# Patient Record
Sex: MALE | Race: WHITE | Employment: OTHER | URBAN - METROPOLITAN AREA
[De-identification: names, ages, dates, MRNs, and addresses within clinical notes are randomized per-mention and may not be internally consistent; named-entity substitution may affect disease eponyms.]

---

## 2018-03-29 DIAGNOSIS — D51.0 PERNICIOUS ANEMIA: Primary | ICD-10-CM

## 2018-03-29 DIAGNOSIS — E55.9 VITAMIN D DEFICIENCY: ICD-10-CM

## 2018-03-29 DIAGNOSIS — E03.9 ACQUIRED HYPOTHYROIDISM: ICD-10-CM

## 2018-03-29 DIAGNOSIS — M15.0 PRIMARY GENERALIZED (OSTEO)ARTHRITIS: ICD-10-CM

## 2018-03-29 DIAGNOSIS — Z79.899 HIGH RISK MEDICATION USE: ICD-10-CM

## 2018-03-29 DIAGNOSIS — Z12.5 SCREENING PSA (PROSTATE SPECIFIC ANTIGEN): ICD-10-CM

## 2018-03-29 DIAGNOSIS — R53.83 FATIGUE, UNSPECIFIED TYPE: ICD-10-CM

## 2018-03-29 DIAGNOSIS — R13.10 PROBLEMS WITH SWALLOWING AND MASTICATION: ICD-10-CM

## 2018-03-29 DIAGNOSIS — E78.49 OTHER HYPERLIPIDEMIA: ICD-10-CM

## 2018-04-11 ENCOUNTER — HOSPITAL ENCOUNTER (OUTPATIENT)
Age: 83
Discharge: HOME | End: 2018-04-13
Payer: MEDICARE

## 2018-04-11 DIAGNOSIS — M15.0 PRIMARY GENERALIZED (OSTEO)ARTHRITIS: ICD-10-CM

## 2018-04-11 DIAGNOSIS — E55.9 VITAMIN D DEFICIENCY: ICD-10-CM

## 2018-04-11 DIAGNOSIS — R13.10 PROBLEMS WITH SWALLOWING AND MASTICATION: ICD-10-CM

## 2018-04-11 DIAGNOSIS — Z12.5 SCREENING PSA (PROSTATE SPECIFIC ANTIGEN): ICD-10-CM

## 2018-04-11 DIAGNOSIS — E03.9 ACQUIRED HYPOTHYROIDISM: ICD-10-CM

## 2018-04-11 DIAGNOSIS — R53.83 FATIGUE, UNSPECIFIED TYPE: ICD-10-CM

## 2018-04-11 DIAGNOSIS — D51.0 PERNICIOUS ANEMIA: ICD-10-CM

## 2018-04-11 DIAGNOSIS — Z79.899 HIGH RISK MEDICATION USE: ICD-10-CM

## 2018-04-11 DIAGNOSIS — E78.49 OTHER HYPERLIPIDEMIA: ICD-10-CM

## 2018-04-11 LAB
ALBUMIN SERPL-MCNC: 3.9 G/DL (ref 3.5–5.2)
ALP BLD-CCNC: 52 U/L (ref 40–129)
ALT SERPL-CCNC: 7 U/L (ref 0–40)
ANION GAP SERPL CALCULATED.3IONS-SCNC: 13 MMOL/L (ref 7–16)
AST SERPL-CCNC: 20 U/L (ref 0–39)
BILIRUB SERPL-MCNC: 0.3 MG/DL (ref 0–1.2)
BILIRUBIN URINE: NEGATIVE
BLOOD, URINE: NEGATIVE
BUN BLDV-MCNC: 18 MG/DL (ref 8–23)
CALCIUM SERPL-MCNC: 9.4 MG/DL (ref 8.6–10.2)
CHLORIDE BLD-SCNC: 107 MMOL/L (ref 98–107)
CHOLESTEROL, TOTAL: 139 MG/DL (ref 0–199)
CLARITY: CLEAR
CO2: 26 MMOL/L (ref 22–29)
COLOR: NORMAL
CREAT SERPL-MCNC: 1.4 MG/DL (ref 0.7–1.2)
GFR AFRICAN AMERICAN: 58
GFR NON-AFRICAN AMERICAN: 48 ML/MIN/1.73
GLUCOSE BLD-MCNC: 86 MG/DL (ref 74–109)
GLUCOSE URINE: NEGATIVE MG/DL
HBA1C MFR BLD: 5.2 % (ref 4.8–5.9)
HCT VFR BLD CALC: 38.2 % (ref 37–54)
HDLC SERPL-MCNC: 43 MG/DL
HEMOGLOBIN: 11.2 G/DL (ref 12.5–16.5)
KETONES, URINE: NEGATIVE MG/DL
LDL CHOLESTEROL CALCULATED: 76 MG/DL (ref 0–99)
LEUKOCYTE ESTERASE, URINE: NEGATIVE
MCH RBC QN AUTO: 26 PG (ref 26–35)
MCHC RBC AUTO-ENTMCNC: 29.3 % (ref 32–34.5)
MCV RBC AUTO: 88.6 FL (ref 80–99.9)
NITRITE, URINE: NEGATIVE
PDW BLD-RTO: 16.2 FL (ref 11.5–15)
PH UA: 6 (ref 5–9)
PLATELET # BLD: 223 E9/L (ref 130–450)
PMV BLD AUTO: 10.5 FL (ref 7–12)
POTASSIUM SERPL-SCNC: 4.5 MMOL/L (ref 3.5–5)
PROSTATE SPECIFIC ANTIGEN: 1.57 NG/ML (ref 0–4)
PROTEIN UA: NEGATIVE MG/DL
RBC # BLD: 4.31 E12/L (ref 3.8–5.8)
SODIUM BLD-SCNC: 146 MMOL/L (ref 132–146)
SPECIFIC GRAVITY UA: 1.01 (ref 1–1.03)
TOTAL PROTEIN: 7.2 G/DL (ref 6.4–8.3)
TRIGL SERPL-MCNC: 99 MG/DL (ref 0–149)
TSH SERPL DL<=0.05 MIU/L-ACNC: 0.84 UIU/ML (ref 0.27–4.2)
URIC ACID, SERUM: 6.3 MG/DL (ref 3.4–7)
UROBILINOGEN, URINE: 0.2 E.U./DL
VITAMIN D 25-HYDROXY: 21 NG/ML (ref 30–100)
VLDLC SERPL CALC-MCNC: 20 MG/DL
WBC # BLD: 6.1 E9/L (ref 4.5–11.5)

## 2018-04-11 PROCEDURE — 81003 URINALYSIS AUTO W/O SCOPE: CPT

## 2018-04-11 PROCEDURE — 85027 COMPLETE CBC AUTOMATED: CPT

## 2018-04-11 PROCEDURE — 84443 ASSAY THYROID STIM HORMONE: CPT

## 2018-04-11 PROCEDURE — 84550 ASSAY OF BLOOD/URIC ACID: CPT

## 2018-04-11 PROCEDURE — G0103 PSA SCREENING: HCPCS

## 2018-04-11 PROCEDURE — 82306 VITAMIN D 25 HYDROXY: CPT

## 2018-04-11 PROCEDURE — 83036 HEMOGLOBIN GLYCOSYLATED A1C: CPT

## 2018-04-11 PROCEDURE — 80053 COMPREHEN METABOLIC PANEL: CPT

## 2018-04-11 PROCEDURE — 80061 LIPID PANEL: CPT

## 2018-04-18 ENCOUNTER — OFFICE VISIT (OUTPATIENT)
Dept: PRIMARY CARE CLINIC | Age: 83
End: 2018-04-18
Payer: MEDICARE

## 2018-04-18 VITALS
SYSTOLIC BLOOD PRESSURE: 128 MMHG | RESPIRATION RATE: 16 BRPM | DIASTOLIC BLOOD PRESSURE: 72 MMHG | BODY MASS INDEX: 20.76 KG/M2 | TEMPERATURE: 97 F | HEART RATE: 80 BPM | HEIGHT: 70 IN | OXYGEN SATURATION: 96 % | WEIGHT: 145 LBS

## 2018-04-18 DIAGNOSIS — E03.9 ACQUIRED HYPOTHYROIDISM: ICD-10-CM

## 2018-04-18 DIAGNOSIS — R13.19 ESOPHAGEAL DYSPHAGIA: ICD-10-CM

## 2018-04-18 DIAGNOSIS — F41.9 ANXIETY: ICD-10-CM

## 2018-04-18 DIAGNOSIS — R97.20 PSA ELEVATION: ICD-10-CM

## 2018-04-18 DIAGNOSIS — E78.49 OTHER HYPERLIPIDEMIA: ICD-10-CM

## 2018-04-18 DIAGNOSIS — N18.30 STAGE 3 CHRONIC KIDNEY DISEASE (HCC): ICD-10-CM

## 2018-04-18 DIAGNOSIS — E55.9 VITAMIN D DEFICIENCY: ICD-10-CM

## 2018-04-18 DIAGNOSIS — R00.2 PALPITATION: ICD-10-CM

## 2018-04-18 DIAGNOSIS — E78.2 MIXED HYPERLIPIDEMIA: ICD-10-CM

## 2018-04-18 DIAGNOSIS — D63.8 ANEMIA, CHRONIC DISEASE: ICD-10-CM

## 2018-04-18 DIAGNOSIS — R73.09 ELEVATED HEMOGLOBIN A1C: ICD-10-CM

## 2018-04-18 DIAGNOSIS — Z00.00 ROUTINE GENERAL MEDICAL EXAMINATION AT A HEALTH CARE FACILITY: Primary | ICD-10-CM

## 2018-04-18 DIAGNOSIS — M15.0 PRIMARY GENERALIZED (OSTEO)ARTHRITIS: ICD-10-CM

## 2018-04-18 PROCEDURE — G0439 PPPS, SUBSEQ VISIT: HCPCS | Performed by: INTERNAL MEDICINE

## 2018-04-18 RX ORDER — SIMVASTATIN 40 MG
40 TABLET ORAL NIGHTLY
Qty: 90 TABLET | Refills: 3 | Status: SHIPPED | OUTPATIENT
Start: 2018-04-18 | End: 2018-10-24 | Stop reason: SDUPTHER

## 2018-04-18 RX ORDER — DUTASTERIDE 0.5 MG/1
0.5 CAPSULE, LIQUID FILLED ORAL DAILY
COMMUNITY
End: 2018-10-24

## 2018-04-18 RX ORDER — LEVOTHYROXINE SODIUM 88 UG/1
88 TABLET ORAL DAILY
Qty: 90 TABLET | Refills: 3 | Status: SHIPPED | OUTPATIENT
Start: 2018-04-18 | End: 2018-10-24 | Stop reason: SDUPTHER

## 2018-04-18 ASSESSMENT — LIFESTYLE VARIABLES: HOW OFTEN DO YOU HAVE A DRINK CONTAINING ALCOHOL: 0

## 2018-04-18 ASSESSMENT — PATIENT HEALTH QUESTIONNAIRE - PHQ9: SUM OF ALL RESPONSES TO PHQ QUESTIONS 1-9: 2

## 2018-04-18 ASSESSMENT — ANXIETY QUESTIONNAIRES: GAD7 TOTAL SCORE: 4

## 2018-10-16 ENCOUNTER — TELEPHONE (OUTPATIENT)
Dept: PRIMARY CARE CLINIC | Age: 83
End: 2018-10-16

## 2018-10-16 DIAGNOSIS — E03.9 ACQUIRED HYPOTHYROIDISM: ICD-10-CM

## 2018-10-16 DIAGNOSIS — Z79.899 ENCOUNTER FOR LONG-TERM (CURRENT) USE OF MEDICATIONS: ICD-10-CM

## 2018-10-16 DIAGNOSIS — N18.30 CHRONIC KIDNEY DISEASE, STAGE III (MODERATE) (HCC): ICD-10-CM

## 2018-10-16 DIAGNOSIS — E55.9 VITAMIN D DEFICIENCY: ICD-10-CM

## 2018-10-16 DIAGNOSIS — D63.8 CHRONIC DISEASE ANEMIA: ICD-10-CM

## 2018-10-16 DIAGNOSIS — R97.20 ELEVATED PSA: ICD-10-CM

## 2018-10-16 DIAGNOSIS — M15.0 PRIMARY GENERALIZED (OSTEO)ARTHRITIS: Primary | ICD-10-CM

## 2018-10-16 DIAGNOSIS — Z12.5 PROSTATE CANCER SCREENING: ICD-10-CM

## 2018-10-17 ENCOUNTER — HOSPITAL ENCOUNTER (OUTPATIENT)
Age: 83
Discharge: HOME | End: 2018-10-19
Payer: MEDICARE

## 2018-10-17 DIAGNOSIS — E78.2 MIXED HYPERLIPIDEMIA: ICD-10-CM

## 2018-10-17 DIAGNOSIS — Z12.5 PROSTATE CANCER SCREENING: ICD-10-CM

## 2018-10-17 DIAGNOSIS — D63.8 CHRONIC DISEASE ANEMIA: ICD-10-CM

## 2018-10-17 DIAGNOSIS — E03.9 ACQUIRED HYPOTHYROIDISM: ICD-10-CM

## 2018-10-17 DIAGNOSIS — R97.20 ELEVATED PSA: ICD-10-CM

## 2018-10-17 DIAGNOSIS — Z79.899 ENCOUNTER FOR LONG-TERM (CURRENT) USE OF MEDICATIONS: ICD-10-CM

## 2018-10-17 DIAGNOSIS — E78.49 OTHER HYPERLIPIDEMIA: ICD-10-CM

## 2018-10-17 DIAGNOSIS — R73.09 ELEVATED HEMOGLOBIN A1C: ICD-10-CM

## 2018-10-17 DIAGNOSIS — N18.30 CHRONIC KIDNEY DISEASE, STAGE III (MODERATE) (HCC): ICD-10-CM

## 2018-10-17 DIAGNOSIS — M15.0 PRIMARY GENERALIZED (OSTEO)ARTHRITIS: ICD-10-CM

## 2018-10-17 DIAGNOSIS — E55.9 VITAMIN D DEFICIENCY: ICD-10-CM

## 2018-10-17 LAB
ALBUMIN SERPL-MCNC: 4.2 G/DL (ref 3.5–5.2)
ALP BLD-CCNC: 56 U/L (ref 40–129)
ALT SERPL-CCNC: 11 U/L (ref 0–40)
ANION GAP SERPL CALCULATED.3IONS-SCNC: 12 MMOL/L (ref 7–16)
AST SERPL-CCNC: 31 U/L (ref 0–39)
BASOPHILS ABSOLUTE: 0.03 E9/L (ref 0–0.2)
BASOPHILS RELATIVE PERCENT: 0.5 % (ref 0–2)
BILIRUB SERPL-MCNC: 0.4 MG/DL (ref 0–1.2)
BILIRUBIN URINE: NEGATIVE
BLOOD, URINE: NEGATIVE
BUN BLDV-MCNC: 22 MG/DL (ref 8–23)
CALCIUM SERPL-MCNC: 9.3 MG/DL (ref 8.6–10.2)
CHLORIDE BLD-SCNC: 108 MMOL/L (ref 98–107)
CHOLESTEROL, TOTAL: 139 MG/DL (ref 0–199)
CLARITY: CLEAR
CO2: 25 MMOL/L (ref 22–29)
COLOR: YELLOW
CREAT SERPL-MCNC: 1.4 MG/DL (ref 0.7–1.2)
EOSINOPHILS ABSOLUTE: 0.01 E9/L (ref 0.05–0.5)
EOSINOPHILS RELATIVE PERCENT: 0.2 % (ref 0–6)
GFR AFRICAN AMERICAN: 58
GFR NON-AFRICAN AMERICAN: 48 ML/MIN/1.73
GLUCOSE BLD-MCNC: 81 MG/DL (ref 74–109)
GLUCOSE URINE: NEGATIVE MG/DL
HBA1C MFR BLD: 5.2 % (ref 4–5.6)
HCT VFR BLD CALC: 39.7 % (ref 37–54)
HDLC SERPL-MCNC: 52 MG/DL
HEMOGLOBIN: 11.7 G/DL (ref 12.5–16.5)
IMMATURE GRANULOCYTES #: 0.02 E9/L
IMMATURE GRANULOCYTES %: 0.3 % (ref 0–5)
KETONES, URINE: NEGATIVE MG/DL
LDL CHOLESTEROL CALCULATED: 74 MG/DL (ref 0–99)
LEUKOCYTE ESTERASE, URINE: NEGATIVE
LYMPHOCYTES ABSOLUTE: 2.37 E9/L (ref 1.5–4)
LYMPHOCYTES RELATIVE PERCENT: 40.8 % (ref 20–42)
MCH RBC QN AUTO: 26.1 PG (ref 26–35)
MCHC RBC AUTO-ENTMCNC: 29.5 % (ref 32–34.5)
MCV RBC AUTO: 88.6 FL (ref 80–99.9)
MONOCYTES ABSOLUTE: 1.47 E9/L (ref 0.1–0.95)
MONOCYTES RELATIVE PERCENT: 25.3 % (ref 2–12)
NEUTROPHILS ABSOLUTE: 1.91 E9/L (ref 1.8–7.3)
NEUTROPHILS RELATIVE PERCENT: 32.9 % (ref 43–80)
NITRITE, URINE: NEGATIVE
PDW BLD-RTO: 16.5 FL (ref 11.5–15)
PH UA: 6 (ref 5–9)
PLATELET # BLD: 247 E9/L (ref 130–450)
PMV BLD AUTO: 11.3 FL (ref 7–12)
POTASSIUM SERPL-SCNC: 4.6 MMOL/L (ref 3.5–5)
PROSTATE SPECIFIC ANTIGEN: 1.81 NG/ML (ref 0–4)
PROTEIN UA: NEGATIVE MG/DL
RBC # BLD: 4.48 E12/L (ref 3.8–5.8)
SODIUM BLD-SCNC: 145 MMOL/L (ref 132–146)
SPECIFIC GRAVITY UA: 1.01 (ref 1–1.03)
TOTAL PROTEIN: 7.4 G/DL (ref 6.4–8.3)
TRIGL SERPL-MCNC: 66 MG/DL (ref 0–149)
TSH SERPL DL<=0.05 MIU/L-ACNC: 4.3 UIU/ML (ref 0.27–4.2)
UROBILINOGEN, URINE: 0.2 E.U./DL
VLDLC SERPL CALC-MCNC: 13 MG/DL
WBC # BLD: 5.8 E9/L (ref 4.5–11.5)

## 2018-10-17 PROCEDURE — 80061 LIPID PANEL: CPT

## 2018-10-17 PROCEDURE — G0103 PSA SCREENING: HCPCS

## 2018-10-17 PROCEDURE — 81003 URINALYSIS AUTO W/O SCOPE: CPT

## 2018-10-17 PROCEDURE — 80053 COMPREHEN METABOLIC PANEL: CPT

## 2018-10-17 PROCEDURE — 82652 VIT D 1 25-DIHYDROXY: CPT

## 2018-10-17 PROCEDURE — 85025 COMPLETE CBC W/AUTO DIFF WBC: CPT

## 2018-10-17 PROCEDURE — 83036 HEMOGLOBIN GLYCOSYLATED A1C: CPT

## 2018-10-17 PROCEDURE — 84443 ASSAY THYROID STIM HORMONE: CPT

## 2018-10-19 LAB — VITAMIN D 1,25-DIHYDROXY: 68.4 PG/ML (ref 19.9–79.3)

## 2018-10-24 ENCOUNTER — OFFICE VISIT (OUTPATIENT)
Dept: PRIMARY CARE CLINIC | Age: 83
End: 2018-10-24
Payer: MEDICARE

## 2018-10-24 ENCOUNTER — TELEPHONE (OUTPATIENT)
Dept: PRIMARY CARE CLINIC | Age: 83
End: 2018-10-24

## 2018-10-24 VITALS
TEMPERATURE: 98.1 F | OXYGEN SATURATION: 98 % | SYSTOLIC BLOOD PRESSURE: 128 MMHG | BODY MASS INDEX: 21.19 KG/M2 | RESPIRATION RATE: 16 BRPM | HEART RATE: 68 BPM | HEIGHT: 70 IN | WEIGHT: 148 LBS | DIASTOLIC BLOOD PRESSURE: 72 MMHG

## 2018-10-24 DIAGNOSIS — G89.29 CHRONIC RIGHT SHOULDER PAIN: ICD-10-CM

## 2018-10-24 DIAGNOSIS — M15.0 PRIMARY GENERALIZED (OSTEO)ARTHRITIS: ICD-10-CM

## 2018-10-24 DIAGNOSIS — E78.2 MIXED HYPERLIPIDEMIA: ICD-10-CM

## 2018-10-24 DIAGNOSIS — Z23 ENCOUNTER FOR IMMUNIZATION: Primary | ICD-10-CM

## 2018-10-24 DIAGNOSIS — R53.83 FATIGUE, UNSPECIFIED TYPE: ICD-10-CM

## 2018-10-24 DIAGNOSIS — G89.29 CHRONIC RIGHT SHOULDER PAIN: Primary | ICD-10-CM

## 2018-10-24 DIAGNOSIS — Z71.85 IMMUNIZATION COUNSELING: ICD-10-CM

## 2018-10-24 DIAGNOSIS — M25.511 CHRONIC RIGHT SHOULDER PAIN: ICD-10-CM

## 2018-10-24 DIAGNOSIS — R13.19 ESOPHAGEAL DYSPHAGIA: ICD-10-CM

## 2018-10-24 DIAGNOSIS — N40.0 PROSTATE ENLARGEMENT: ICD-10-CM

## 2018-10-24 DIAGNOSIS — E03.9 ACQUIRED HYPOTHYROIDISM: ICD-10-CM

## 2018-10-24 DIAGNOSIS — M25.511 CHRONIC RIGHT SHOULDER PAIN: Primary | ICD-10-CM

## 2018-10-24 DIAGNOSIS — N18.30 CHRONIC KIDNEY DISEASE, STAGE 3 (HCC): ICD-10-CM

## 2018-10-24 DIAGNOSIS — D63.8 ANEMIA, CHRONIC DISEASE: ICD-10-CM

## 2018-10-24 PROCEDURE — G0008 ADMIN INFLUENZA VIRUS VAC: HCPCS | Performed by: INTERNAL MEDICINE

## 2018-10-24 PROCEDURE — 99214 OFFICE O/P EST MOD 30 MIN: CPT | Performed by: INTERNAL MEDICINE

## 2018-10-24 PROCEDURE — 90688 IIV4 VACCINE SPLT 0.5 ML IM: CPT | Performed by: INTERNAL MEDICINE

## 2018-10-24 RX ORDER — SIMVASTATIN 40 MG
40 TABLET ORAL NIGHTLY
Qty: 90 TABLET | Refills: 3 | Status: SHIPPED | OUTPATIENT
Start: 2018-10-24 | End: 2019-05-02 | Stop reason: SDUPTHER

## 2018-10-24 RX ORDER — FINASTERIDE 5 MG/1
5 TABLET, FILM COATED ORAL DAILY
Refills: 0 | COMMUNITY
Start: 2018-07-18 | End: 2019-05-02

## 2018-10-24 RX ORDER — LEVOTHYROXINE SODIUM 88 UG/1
88 TABLET ORAL DAILY
Qty: 90 TABLET | Refills: 3 | Status: SHIPPED | OUTPATIENT
Start: 2018-10-24 | End: 2019-05-02 | Stop reason: SDUPTHER

## 2018-10-24 ASSESSMENT — ENCOUNTER SYMPTOMS
DIARRHEA: 0
BLOOD IN STOOL: 0
SHORTNESS OF BREATH: 0
EYE REDNESS: 0
EYE PAIN: 0
STRIDOR: 0
NAUSEA: 0

## 2018-10-24 NOTE — PROGRESS NOTES
Vaccine Information Sheet, \"Influenza - Inactivated\"  given to Saira Mireles, or parent/legal guardian of  Saira Mireles and verbalized understanding. Patient responses:    Have you ever had a reaction to a flu vaccine? No  Are you able to eat eggs without adverse effects? Yes  Do you have any current illness? No  Have you ever had Guillian [place] Syndrome? No    Flu vaccine given per order. Please see immunization tab.

## 2018-11-07 ENCOUNTER — OFFICE VISIT (OUTPATIENT)
Dept: ORTHOPEDIC SURGERY | Age: 83
End: 2018-11-07
Payer: MEDICARE

## 2018-11-07 VITALS
HEIGHT: 70 IN | TEMPERATURE: 98 F | WEIGHT: 145 LBS | HEART RATE: 70 BPM | DIASTOLIC BLOOD PRESSURE: 84 MMHG | SYSTOLIC BLOOD PRESSURE: 140 MMHG | BODY MASS INDEX: 20.76 KG/M2

## 2018-11-07 DIAGNOSIS — M25.511 ACUTE PAIN OF RIGHT SHOULDER: Primary | ICD-10-CM

## 2018-11-07 PROCEDURE — 99203 OFFICE O/P NEW LOW 30 MIN: CPT | Performed by: ORTHOPAEDIC SURGERY

## 2018-11-07 NOTE — PROGRESS NOTES
New Shoulder Patient Visit     Referring Provider:   Dr Tiffanie Peoples    Dear Dr. Tiffanie Peoples,    Thank you for your referral of Alexandria Mtz. He was seen by me on 11/7/18. Please refer to my office note below. If you have any questions or concerns, please do not hesitate to call. Akua Valle MD  Orthopaedic Surgery                     CHIEF COMPLAINT:   Chief Complaint   Patient presents with    Shoulder Pain     (R) shoulder x several months, denies fall or injury; pain in front lateral aspect as well as occassional posterior shoulder blade pain    Results     (R) shoulder XR today        HPI:      Alexandria Mtz is a 80y.o. year old male who is seen today  for evaluation of right shoulder pain. He reports the pain has been ongoing for the past Several months. It came on gradually without any injury. The pain is intermittent. He does report having good function of the shoulder as well as good range of motion. He has occasional aches and also occasional stabbing pain that is short lived. He has not had any recent treatment. The patient is right hand dominant. The patient is not working. He is retired    PAST MEDICAL HISTORY  Past Medical History:   Diagnosis Date    Enlarged prostate     Hyperlipidemia     Thyroid disease        PAST SURGICAL HISTORY  Past Surgical History:   Procedure Laterality Date    ESOPHAGUS SURGERY      x 3    TOOTH EXTRACTION         FAMILY HISTORY   No family history on file. SOCIAL HISTORY  Social History     Occupational History    Not on file.      Social History Main Topics    Smoking status: Never Smoker    Smokeless tobacco: Never Used    Alcohol use No    Drug use: No    Sexual activity: Not on file       CURRENT MEDICATIONS     Current Outpatient Prescriptions:     finasteride (PROSCAR) 5 MG tablet, Take 5 mg by mouth daily , Disp: , Rfl: 0    Cholecalciferol (VITAMIN D3) 1000 units CAPS, Take by mouth daily, Disp: , Rfl:     Naproxen Sodium (ALEVE degenerative changes. There is minimal evidence of superior humeral head migration to suggest rotator cuff pathology    Radiographic findings reviewed with patient    ASSESSMENT   Right shoulder pain      PLAN  We discussed his shoulder today. He is not having pain today. He has very good range of motion and strength. I recommended a home exercise program.  He is agreeable. We did discuss trying a steroid injection if he develops more persistent pain. He is in agreement. We will see him back in about 2 months.         Sunil Pedraza MD  Orthopaedic Surgery   11/7/18  10:40 AM

## 2019-01-07 ENCOUNTER — OFFICE VISIT (OUTPATIENT)
Dept: ORTHOPEDIC SURGERY | Age: 84
End: 2019-01-07
Payer: MEDICARE

## 2019-01-07 VITALS
HEART RATE: 79 BPM | DIASTOLIC BLOOD PRESSURE: 77 MMHG | BODY MASS INDEX: 21.47 KG/M2 | HEIGHT: 70 IN | WEIGHT: 150 LBS | SYSTOLIC BLOOD PRESSURE: 139 MMHG

## 2019-01-07 DIAGNOSIS — M25.511 ACUTE PAIN OF RIGHT SHOULDER: Primary | ICD-10-CM

## 2019-01-07 PROCEDURE — 99213 OFFICE O/P EST LOW 20 MIN: CPT | Performed by: ORTHOPAEDIC SURGERY

## 2019-03-28 ENCOUNTER — TELEPHONE (OUTPATIENT)
Dept: PRIMARY CARE CLINIC | Age: 84
End: 2019-03-28

## 2019-03-28 DIAGNOSIS — L98.9 SKIN LESION: Primary | ICD-10-CM

## 2019-04-22 ENCOUNTER — TELEPHONE (OUTPATIENT)
Dept: PRIMARY CARE CLINIC | Age: 84
End: 2019-04-22

## 2019-04-22 DIAGNOSIS — Z79.899 ENCOUNTER FOR LONG-TERM (CURRENT) USE OF MEDICATIONS: Primary | ICD-10-CM

## 2019-04-22 DIAGNOSIS — D63.8 CHRONIC DISEASE ANEMIA: ICD-10-CM

## 2019-04-22 DIAGNOSIS — N18.30 CHRONIC RENAL DISEASE, STAGE III (HCC): ICD-10-CM

## 2019-04-22 DIAGNOSIS — E03.9 ACQUIRED HYPOTHYROIDISM: ICD-10-CM

## 2019-04-22 DIAGNOSIS — E78.2 MIXED HYPERLIPIDEMIA: ICD-10-CM

## 2019-04-25 ENCOUNTER — HOSPITAL ENCOUNTER (OUTPATIENT)
Age: 84
Discharge: HOME | End: 2019-04-27
Payer: MEDICARE

## 2019-04-25 DIAGNOSIS — Z79.899 ENCOUNTER FOR LONG-TERM (CURRENT) USE OF MEDICATIONS: ICD-10-CM

## 2019-04-25 DIAGNOSIS — D63.8 CHRONIC DISEASE ANEMIA: ICD-10-CM

## 2019-04-25 DIAGNOSIS — N18.30 CHRONIC RENAL DISEASE, STAGE III (HCC): ICD-10-CM

## 2019-04-25 DIAGNOSIS — E03.9 ACQUIRED HYPOTHYROIDISM: ICD-10-CM

## 2019-04-25 DIAGNOSIS — E78.2 MIXED HYPERLIPIDEMIA: ICD-10-CM

## 2019-04-25 LAB
ALBUMIN SERPL-MCNC: 4.2 G/DL (ref 3.5–5.2)
ALP BLD-CCNC: 50 U/L (ref 40–129)
ALT SERPL-CCNC: 8 U/L (ref 0–40)
ANION GAP SERPL CALCULATED.3IONS-SCNC: 14 MMOL/L (ref 7–16)
AST SERPL-CCNC: 20 U/L (ref 0–39)
BASOPHILS ABSOLUTE: 0.03 E9/L (ref 0–0.2)
BASOPHILS RELATIVE PERCENT: 0.6 % (ref 0–2)
BILIRUB SERPL-MCNC: 0.3 MG/DL (ref 0–1.2)
BUN BLDV-MCNC: 23 MG/DL (ref 8–23)
CALCIUM SERPL-MCNC: 9.6 MG/DL (ref 8.6–10.2)
CHLORIDE BLD-SCNC: 109 MMOL/L (ref 98–107)
CHOLESTEROL, TOTAL: 139 MG/DL (ref 0–199)
CO2: 23 MMOL/L (ref 22–29)
CREAT SERPL-MCNC: 1.3 MG/DL (ref 0.7–1.2)
EOSINOPHILS ABSOLUTE: 0.01 E9/L (ref 0.05–0.5)
EOSINOPHILS RELATIVE PERCENT: 0.2 % (ref 0–6)
GFR AFRICAN AMERICAN: >60
GFR NON-AFRICAN AMERICAN: 52 ML/MIN/1.73
GLUCOSE BLD-MCNC: 86 MG/DL (ref 74–99)
HCT VFR BLD CALC: 39.5 % (ref 37–54)
HDLC SERPL-MCNC: 41 MG/DL
HEMOGLOBIN: 11.6 G/DL (ref 12.5–16.5)
IMMATURE GRANULOCYTES #: 0.02 E9/L
IMMATURE GRANULOCYTES %: 0.4 % (ref 0–5)
LDL CHOLESTEROL CALCULATED: 77 MG/DL (ref 0–99)
LYMPHOCYTES ABSOLUTE: 2.5 E9/L (ref 1.5–4)
LYMPHOCYTES RELATIVE PERCENT: 46 % (ref 20–42)
MCH RBC QN AUTO: 26.5 PG (ref 26–35)
MCHC RBC AUTO-ENTMCNC: 29.4 % (ref 32–34.5)
MCV RBC AUTO: 90.2 FL (ref 80–99.9)
MONOCYTES ABSOLUTE: 1.4 E9/L (ref 0.1–0.95)
MONOCYTES RELATIVE PERCENT: 25.7 % (ref 2–12)
NEUTROPHILS ABSOLUTE: 1.48 E9/L (ref 1.8–7.3)
NEUTROPHILS RELATIVE PERCENT: 27.1 % (ref 43–80)
PDW BLD-RTO: 16.5 FL (ref 11.5–15)
PLATELET # BLD: 219 E9/L (ref 130–450)
PMV BLD AUTO: 11.2 FL (ref 7–12)
POTASSIUM SERPL-SCNC: 4.3 MMOL/L (ref 3.5–5)
RBC # BLD: 4.38 E12/L (ref 3.8–5.8)
SODIUM BLD-SCNC: 146 MMOL/L (ref 132–146)
TOTAL PROTEIN: 7.6 G/DL (ref 6.4–8.3)
TRIGL SERPL-MCNC: 104 MG/DL (ref 0–149)
TSH SERPL DL<=0.05 MIU/L-ACNC: 2.27 UIU/ML (ref 0.27–4.2)
URIC ACID, SERUM: 6.4 MG/DL (ref 3.4–7)
VLDLC SERPL CALC-MCNC: 21 MG/DL
WBC # BLD: 5.4 E9/L (ref 4.5–11.5)

## 2019-04-25 PROCEDURE — 84443 ASSAY THYROID STIM HORMONE: CPT

## 2019-04-25 PROCEDURE — 80053 COMPREHEN METABOLIC PANEL: CPT

## 2019-04-25 PROCEDURE — 84550 ASSAY OF BLOOD/URIC ACID: CPT

## 2019-04-25 PROCEDURE — 85025 COMPLETE CBC W/AUTO DIFF WBC: CPT

## 2019-04-25 PROCEDURE — 80061 LIPID PANEL: CPT

## 2019-05-02 ENCOUNTER — OFFICE VISIT (OUTPATIENT)
Dept: PRIMARY CARE CLINIC | Age: 84
End: 2019-05-02
Payer: MEDICARE

## 2019-05-02 VITALS
WEIGHT: 153.12 LBS | SYSTOLIC BLOOD PRESSURE: 128 MMHG | DIASTOLIC BLOOD PRESSURE: 82 MMHG | TEMPERATURE: 98 F | BODY MASS INDEX: 21.92 KG/M2 | HEIGHT: 70 IN | OXYGEN SATURATION: 98 % | HEART RATE: 73 BPM | RESPIRATION RATE: 16 BRPM

## 2019-05-02 DIAGNOSIS — E55.9 VITAMIN D DEFICIENCY: ICD-10-CM

## 2019-05-02 DIAGNOSIS — E78.2 MIXED HYPERLIPIDEMIA: ICD-10-CM

## 2019-05-02 DIAGNOSIS — Z12.5 SCREENING PSA (PROSTATE SPECIFIC ANTIGEN): ICD-10-CM

## 2019-05-02 DIAGNOSIS — N18.30 CHRONIC KIDNEY DISEASE, STAGE 3 (HCC): Primary | ICD-10-CM

## 2019-05-02 DIAGNOSIS — M15.0 PRIMARY GENERALIZED (OSTEO)ARTHRITIS: ICD-10-CM

## 2019-05-02 DIAGNOSIS — E03.9 ACQUIRED HYPOTHYROIDISM: ICD-10-CM

## 2019-05-02 DIAGNOSIS — D63.8 ANEMIA, CHRONIC DISEASE: ICD-10-CM

## 2019-05-02 DIAGNOSIS — K22.2 LOWER ESOPHAGEAL RING (SCHATZKI): ICD-10-CM

## 2019-05-02 PROCEDURE — 99214 OFFICE O/P EST MOD 30 MIN: CPT | Performed by: INTERNAL MEDICINE

## 2019-05-02 RX ORDER — TRIAMCINOLONE ACETONIDE 1 MG/G
CREAM TOPICAL 2 TIMES DAILY PRN
Qty: 1 G | Refills: 1 | Status: SHIPPED | OUTPATIENT
Start: 2019-05-02 | End: 2019-08-21 | Stop reason: SDUPTHER

## 2019-05-02 RX ORDER — SIMVASTATIN 40 MG
40 TABLET ORAL NIGHTLY
Qty: 90 TABLET | Refills: 3 | Status: SHIPPED | OUTPATIENT
Start: 2019-05-02 | End: 2019-08-21 | Stop reason: SDUPTHER

## 2019-05-02 RX ORDER — TRIAMCINOLONE ACETONIDE 1 MG/G
CREAM TOPICAL
COMMUNITY
End: 2019-05-02 | Stop reason: SDUPTHER

## 2019-05-02 RX ORDER — LEVOTHYROXINE SODIUM 88 UG/1
88 TABLET ORAL DAILY
Qty: 90 TABLET | Refills: 3 | Status: SHIPPED | OUTPATIENT
Start: 2019-05-02 | End: 2019-08-21 | Stop reason: SDUPTHER

## 2019-05-02 ASSESSMENT — ENCOUNTER SYMPTOMS
RHINORRHEA: 1
DIARRHEA: 0
CHEST TIGHTNESS: 0
CONSTIPATION: 0
ABDOMINAL PAIN: 0
SORE THROAT: 0
COUGH: 0
NAUSEA: 0
SHORTNESS OF BREATH: 0
VOMITING: 0
EYE PAIN: 0
BLOOD IN STOOL: 0

## 2019-05-02 ASSESSMENT — PATIENT HEALTH QUESTIONNAIRE - PHQ9
SUM OF ALL RESPONSES TO PHQ QUESTIONS 1-9: 0
1. LITTLE INTEREST OR PLEASURE IN DOING THINGS: 0
2. FEELING DOWN, DEPRESSED OR HOPELESS: 0
SUM OF ALL RESPONSES TO PHQ9 QUESTIONS 1 & 2: 0
SUM OF ALL RESPONSES TO PHQ QUESTIONS 1-9: 0

## 2019-05-02 NOTE — PROGRESS NOTES
nervous/anxious. Current Outpatient Medications:     simvastatin (ZOCOR) 40 MG tablet, Take 1 tablet by mouth nightly, Disp: 90 tablet, Rfl: 3    levothyroxine (SYNTHROID) 88 MCG tablet, Take 1 tablet by mouth Daily, Disp: 90 tablet, Rfl: 3    triamcinolone (KENALOG) 0.1 % cream, Apply topically 2 times daily as needed (rash) Indications: PRN Apply topically 2 times daily. , Disp: 1 g, Rfl: 1    Cholecalciferol (VITAMIN D3) 1000 units CAPS, Take by mouth daily, Disp: , Rfl:     Naproxen Sodium (ALEVE PO), Take by mouth nightly as needed, Disp: , Rfl:     No Known Allergies    Past Medical History:   Diagnosis Date    Enlarged prostate     Hyperlipidemia     Lower esophageal ring (Schatzki) 5/2/2019    Mixed hyperlipidemia 5/2/2019    Thyroid disease        Past Surgical History:   Procedure Laterality Date    ESOPHAGUS SURGERY      x 3    TOOTH EXTRACTION         No family history on file. Social History     Socioeconomic History    Marital status:       Spouse name: Not on file    Number of children: Not on file    Years of education: Not on file    Highest education level: Not on file   Occupational History    Not on file   Social Needs    Financial resource strain: Not on file    Food insecurity:     Worry: Not on file     Inability: Not on file    Transportation needs:     Medical: Not on file     Non-medical: Not on file   Tobacco Use    Smoking status: Never Smoker    Smokeless tobacco: Never Used   Substance and Sexual Activity    Alcohol use: No    Drug use: No    Sexual activity: Not on file   Lifestyle    Physical activity:     Days per week: Not on file     Minutes per session: Not on file    Stress: Not on file   Relationships    Social connections:     Talks on phone: Not on file     Gets together: Not on file     Attends Buddhist service: Not on file     Active member of club or organization: Not on file     Attends meetings of clubs or organizations: Not on file     Relationship status: Not on file    Intimate partner violence:     Fear of current or ex partner: Not on file     Emotionally abused: Not on file     Physically abused: Not on file     Forced sexual activity: Not on file   Other Topics Concern    Not on file   Social History Narrative    Not on file       Vitals:    05/02/19 1257   BP: 128/82   Site: Left Upper Arm   Position: Sitting   Cuff Size: Small Adult   Pulse: 73   Resp: 16   Temp: 98 °F (36.7 °C)   TempSrc: Tympanic   SpO2: 98%   Weight: 153 lb 1.9 oz (69.5 kg)   Height: 5' 10\" (1.778 m)       Exam:  Physical Exam   Constitutional: He is oriented to person, place, and time. He appears well-developed and well-nourished. HENT:   Head: Normocephalic and atraumatic. Right Ear: External ear normal.   Left Ear: External ear normal.   Mouth/Throat: Oropharynx is clear and moist.   Eyes: Pupils are equal, round, and reactive to light. EOM are normal.   Neck: Normal range of motion. Neck supple. No thyromegaly present. Cardiovascular: Normal rate, regular rhythm and normal heart sounds. No murmur heard. Pulmonary/Chest: Effort normal and breath sounds normal. He has no wheezes. He has no rales. Abdominal: Soft. Bowel sounds are normal. There is no tenderness. There is no rebound and no guarding. Musculoskeletal: Normal range of motion. He exhibits no edema. Lymphadenopathy:     He has no cervical adenopathy. Neurological: He is alert and oriented to person, place, and time. No cranial nerve deficit. Skin: Skin is warm and dry. Psychiatric: He has a normal mood and affect. Judgment normal.       Assessment and Plan:    [facility] was seen today for hyperlipidemia, thyroid problem, discuss labs and health maintenance.     Diagnoses and all orders for this visit:    Mixed hyperlipidemia  - Continue present treatment   - watch diet   - on simvastatin   - follow labs     Acquired hypothyroidism  - Continue present treatment   - on levothyroxine   - follow labs (last 4/2019) - stable     Chronic kidney disease, stage 3 ([facility] [address]  - Continue present treatment   - follow labs   - avoid NSAIDs    Anemia, chronic disease  - Continue present treatment   - follow labs   - monitor for bleeding   - check Iron, b12   - follow labs     Lower esophageal ring (Schatzki)  - Continue present treatment   - last EGD (2019)   - declined PPI     Vitamin D deficiency  - continue present treatment   - on otc supplement   - follow labs     Primary generalized (osteo)arthritis  - Continue present treatment   - avoid NSAIDs   - tylenol as needed  - stable     Return in about 6 months (around 11/2/2019) for check up and review and labs. Orders Placed This Encounter   Procedures    CBC with Differential     Standing Status:   Future     Standing Expiration Date:   5/2/2020    Comprehensive Metabolic Panel     Standing Status:   Future     Standing Expiration Date:   5/2/2020    Lipid, Fasting     Standing Status:   Future     Standing Expiration Date:   5/2/2020    Magnesium     Standing Status:   Future     Standing Expiration Date:   5/2/2020    TSH without Reflex     Standing Status:   Future     Standing Expiration Date:   5/2/2020    Vitamin D 25 Hydroxy     Standing Status:   Future     Standing Expiration Date:   5/2/2020    Psa screening     Standing Status:   Future     Standing Expiration Date:   5/2/2020    VITAMIN B12 & FOLATE     Standing Status:   Future     Standing Expiration Date:   5/2/2020    IRON AND TIBC     Standing Status:   Future     Standing Expiration Date:   5/2/2020     Order Specific Question:   Is Patient Fasting? Answer:   yes     Order Specific Question:   No of Hours?      Answer:   8    FERRITIN     Standing Status:   Future     Standing Expiration Date:   5/2/2020       Robert Khan MD  5/2/2019  1:49 PM

## 2019-06-20 ENCOUNTER — TELEPHONE (OUTPATIENT)
Dept: PRIMARY CARE CLINIC | Age: 84
End: 2019-06-20

## 2019-08-21 DIAGNOSIS — E78.2 MIXED HYPERLIPIDEMIA: ICD-10-CM

## 2019-08-21 DIAGNOSIS — M15.0 PRIMARY GENERALIZED (OSTEO)ARTHRITIS: ICD-10-CM

## 2019-08-21 DIAGNOSIS — E03.9 ACQUIRED HYPOTHYROIDISM: ICD-10-CM

## 2019-08-21 RX ORDER — TRIAMCINOLONE ACETONIDE 1 MG/G
CREAM TOPICAL 2 TIMES DAILY PRN
Qty: 80 G | Refills: 1 | Status: SHIPPED | OUTPATIENT
Start: 2019-08-21 | End: 2019-11-21 | Stop reason: SDUPTHER

## 2019-08-21 RX ORDER — SIMVASTATIN 40 MG
40 TABLET ORAL NIGHTLY
Qty: 90 TABLET | Refills: 1 | Status: SHIPPED
Start: 2019-08-21 | End: 2020-05-07 | Stop reason: SDUPTHER

## 2019-08-21 RX ORDER — LEVOTHYROXINE SODIUM 88 UG/1
88 TABLET ORAL DAILY
Qty: 90 TABLET | Refills: 1 | Status: SHIPPED | OUTPATIENT
Start: 2019-08-21 | End: 2019-11-21 | Stop reason: SDUPTHER

## 2019-11-07 ENCOUNTER — HOSPITAL ENCOUNTER (OUTPATIENT)
Age: 84
Discharge: HOME | End: 2019-11-09
Payer: MEDICARE

## 2019-11-07 LAB
ALBUMIN SERPL-MCNC: 4.3 G/DL (ref 3.5–5.2)
ALP BLD-CCNC: 52 U/L (ref 40–129)
ALT SERPL-CCNC: 9 U/L (ref 0–40)
ANION GAP SERPL CALCULATED.3IONS-SCNC: 16 MMOL/L (ref 7–16)
ANISOCYTOSIS: ABNORMAL
AST SERPL-CCNC: 19 U/L (ref 0–39)
BASOPHILS ABSOLUTE: 0.05 E9/L (ref 0–0.2)
BASOPHILS RELATIVE PERCENT: 0.9 % (ref 0–2)
BILIRUB SERPL-MCNC: 0.3 MG/DL (ref 0–1.2)
BUN BLDV-MCNC: 19 MG/DL (ref 8–23)
CALCIUM SERPL-MCNC: 9.5 MG/DL (ref 8.6–10.2)
CHLORIDE BLD-SCNC: 104 MMOL/L (ref 98–107)
CHOLESTEROL, FASTING: 140 MG/DL (ref 0–199)
CO2: 22 MMOL/L (ref 22–29)
CREAT SERPL-MCNC: 1.4 MG/DL (ref 0.7–1.2)
EOSINOPHILS ABSOLUTE: 0 E9/L (ref 0.05–0.5)
EOSINOPHILS RELATIVE PERCENT: 0.2 % (ref 0–6)
FERRITIN: 33 NG/ML
FOLATE: >20 NG/ML (ref 4.8–24.2)
GFR AFRICAN AMERICAN: 58
GFR NON-AFRICAN AMERICAN: 48 ML/MIN/1.73
GLUCOSE BLD-MCNC: 83 MG/DL (ref 74–99)
HCT VFR BLD CALC: 38.1 % (ref 37–54)
HDLC SERPL-MCNC: 46 MG/DL
HEMOGLOBIN: 10.9 G/DL (ref 12.5–16.5)
IRON SATURATION: 14 % (ref 20–55)
IRON: 44 MCG/DL (ref 59–158)
LDL CHOLESTEROL CALCULATED: 75 MG/DL (ref 0–99)
LYMPHOCYTES ABSOLUTE: 2.15 E9/L (ref 1.5–4)
LYMPHOCYTES RELATIVE PERCENT: 43 % (ref 20–42)
MAGNESIUM: 2.2 MG/DL (ref 1.6–2.6)
MCH RBC QN AUTO: 26.3 PG (ref 26–35)
MCHC RBC AUTO-ENTMCNC: 28.6 % (ref 32–34.5)
MCV RBC AUTO: 92 FL (ref 80–99.9)
MONOCYTES ABSOLUTE: 1.25 E9/L (ref 0.1–0.95)
MONOCYTES RELATIVE PERCENT: 25.4 % (ref 2–12)
NEUTROPHILS ABSOLUTE: 1.55 E9/L (ref 1.8–7.3)
NEUTROPHILS RELATIVE PERCENT: 30.7 % (ref 43–80)
PDW BLD-RTO: 16.9 FL (ref 11.5–15)
PLATELET # BLD: 233 E9/L (ref 130–450)
PMV BLD AUTO: 10.9 FL (ref 7–12)
POTASSIUM SERPL-SCNC: 4 MMOL/L (ref 3.5–5)
PROSTATE SPECIFIC ANTIGEN: 6.94 NG/ML (ref 0–4)
RBC # BLD: 4.14 E12/L (ref 3.8–5.8)
SODIUM BLD-SCNC: 142 MMOL/L (ref 132–146)
TOTAL IRON BINDING CAPACITY: 318 MCG/DL (ref 250–450)
TOTAL PROTEIN: 7.4 G/DL (ref 6.4–8.3)
TRIGLYCERIDE, FASTING: 95 MG/DL (ref 0–149)
TSH SERPL DL<=0.05 MIU/L-ACNC: 4.4 UIU/ML (ref 0.27–4.2)
VITAMIN B-12: 964 PG/ML (ref 211–946)
VITAMIN D 25-HYDROXY: 22 NG/ML (ref 30–100)
VLDLC SERPL CALC-MCNC: 19 MG/DL
WBC # BLD: 5 E9/L (ref 4.5–11.5)

## 2019-11-07 PROCEDURE — 82728 ASSAY OF FERRITIN: CPT

## 2019-11-07 PROCEDURE — 80061 LIPID PANEL: CPT

## 2019-11-07 PROCEDURE — 83550 IRON BINDING TEST: CPT

## 2019-11-07 PROCEDURE — G0103 PSA SCREENING: HCPCS

## 2019-11-07 PROCEDURE — 82607 VITAMIN B-12: CPT

## 2019-11-07 PROCEDURE — 85025 COMPLETE CBC W/AUTO DIFF WBC: CPT

## 2019-11-07 PROCEDURE — 82306 VITAMIN D 25 HYDROXY: CPT

## 2019-11-07 PROCEDURE — 83540 ASSAY OF IRON: CPT

## 2019-11-07 PROCEDURE — 80053 COMPREHEN METABOLIC PANEL: CPT

## 2019-11-07 PROCEDURE — 83735 ASSAY OF MAGNESIUM: CPT

## 2019-11-07 PROCEDURE — 82746 ASSAY OF FOLIC ACID SERUM: CPT

## 2019-11-07 PROCEDURE — 84443 ASSAY THYROID STIM HORMONE: CPT

## 2019-11-09 ENCOUNTER — TELEPHONE (OUTPATIENT)
Dept: FAMILY MEDICINE CLINIC | Age: 84
End: 2019-11-09

## 2019-11-21 ENCOUNTER — OFFICE VISIT (OUTPATIENT)
Dept: PRIMARY CARE CLINIC | Age: 84
End: 2019-11-21
Payer: MEDICARE

## 2019-11-21 VITALS
DIASTOLIC BLOOD PRESSURE: 80 MMHG | BODY MASS INDEX: 21.9 KG/M2 | RESPIRATION RATE: 16 BRPM | HEIGHT: 70 IN | OXYGEN SATURATION: 98 % | TEMPERATURE: 98.5 F | WEIGHT: 153 LBS | SYSTOLIC BLOOD PRESSURE: 138 MMHG | HEART RATE: 84 BPM

## 2019-11-21 DIAGNOSIS — E03.9 ACQUIRED HYPOTHYROIDISM: Primary | ICD-10-CM

## 2019-11-21 DIAGNOSIS — R97.20 ELEVATED PSA: ICD-10-CM

## 2019-11-21 DIAGNOSIS — M15.0 PRIMARY GENERALIZED (OSTEO)ARTHRITIS: ICD-10-CM

## 2019-11-21 DIAGNOSIS — E78.2 MIXED HYPERLIPIDEMIA: ICD-10-CM

## 2019-11-21 DIAGNOSIS — D63.8 ANEMIA, CHRONIC DISEASE: ICD-10-CM

## 2019-11-21 DIAGNOSIS — N18.30 CHRONIC KIDNEY DISEASE, STAGE 3 (HCC): ICD-10-CM

## 2019-11-21 DIAGNOSIS — E55.9 VITAMIN D DEFICIENCY: ICD-10-CM

## 2019-11-21 DIAGNOSIS — K22.2 LOWER ESOPHAGEAL RING (SCHATZKI): ICD-10-CM

## 2019-11-21 PROCEDURE — 4040F PNEUMOC VAC/ADMIN/RCVD: CPT | Performed by: INTERNAL MEDICINE

## 2019-11-21 PROCEDURE — G8420 CALC BMI NORM PARAMETERS: HCPCS | Performed by: INTERNAL MEDICINE

## 2019-11-21 PROCEDURE — 1123F ACP DISCUSS/DSCN MKR DOCD: CPT | Performed by: INTERNAL MEDICINE

## 2019-11-21 PROCEDURE — 99214 OFFICE O/P EST MOD 30 MIN: CPT | Performed by: INTERNAL MEDICINE

## 2019-11-21 PROCEDURE — G8427 DOCREV CUR MEDS BY ELIG CLIN: HCPCS | Performed by: INTERNAL MEDICINE

## 2019-11-21 PROCEDURE — 1036F TOBACCO NON-USER: CPT | Performed by: INTERNAL MEDICINE

## 2019-11-21 PROCEDURE — G8482 FLU IMMUNIZE ORDER/ADMIN: HCPCS | Performed by: INTERNAL MEDICINE

## 2019-11-21 RX ORDER — TRIAMCINOLONE ACETONIDE 1 MG/G
CREAM TOPICAL 2 TIMES DAILY PRN
Qty: 80 G | Refills: 0 | Status: SHIPPED
Start: 2019-11-21 | End: 2020-05-07 | Stop reason: SDUPTHER

## 2019-11-21 RX ORDER — LEVOTHYROXINE SODIUM 0.1 MG/1
100 TABLET ORAL DAILY
Qty: 90 TABLET | Refills: 1 | Status: SHIPPED
Start: 2019-11-21 | End: 2020-05-07 | Stop reason: SDUPTHER

## 2019-11-21 RX ORDER — FERROUS SULFATE 325(65) MG
325 TABLET ORAL SEE ADMIN INSTRUCTIONS
Qty: 60 TABLET | Refills: 5 | Status: SHIPPED
Start: 2019-11-21 | End: 2020-05-07 | Stop reason: SDUPTHER

## 2019-11-21 ASSESSMENT — ENCOUNTER SYMPTOMS
DIARRHEA: 0
VOMITING: 0
SHORTNESS OF BREATH: 0
ABDOMINAL PAIN: 0
CHEST TIGHTNESS: 0
BLOOD IN STOOL: 0
COUGH: 0
CONSTIPATION: 0
RHINORRHEA: 1
EYE PAIN: 0
NAUSEA: 0
SORE THROAT: 0

## 2020-05-05 ENCOUNTER — HOSPITAL ENCOUNTER (OUTPATIENT)
Age: 85
Discharge: HOME | End: 2020-05-07
Payer: MEDICARE

## 2020-05-05 LAB
ALBUMIN SERPL-MCNC: 4.1 G/DL (ref 3.5–5.2)
ALP BLD-CCNC: 57 U/L (ref 40–129)
ALT SERPL-CCNC: 10 U/L (ref 0–40)
ANION GAP SERPL CALCULATED.3IONS-SCNC: 14 MMOL/L (ref 7–16)
AST SERPL-CCNC: 17 U/L (ref 0–39)
BASOPHILS ABSOLUTE: 0.03 E9/L (ref 0–0.2)
BASOPHILS RELATIVE PERCENT: 0.6 % (ref 0–2)
BILIRUB SERPL-MCNC: 0.5 MG/DL (ref 0–1.2)
BUN BLDV-MCNC: 16 MG/DL (ref 8–23)
CALCIUM SERPL-MCNC: 9.8 MG/DL (ref 8.6–10.2)
CHLORIDE BLD-SCNC: 108 MMOL/L (ref 98–107)
CHOLESTEROL, FASTING: 138 MG/DL (ref 0–199)
CO2: 24 MMOL/L (ref 22–29)
CREAT SERPL-MCNC: 1.4 MG/DL (ref 0.7–1.2)
EOSINOPHILS ABSOLUTE: 0.01 E9/L (ref 0.05–0.5)
EOSINOPHILS RELATIVE PERCENT: 0.2 % (ref 0–6)
GFR AFRICAN AMERICAN: 57
GFR NON-AFRICAN AMERICAN: 47 ML/MIN/1.73
GLUCOSE BLD-MCNC: 93 MG/DL (ref 74–99)
HCT VFR BLD CALC: 41.3 % (ref 37–54)
HDLC SERPL-MCNC: 49 MG/DL
HEMOGLOBIN: 12.4 G/DL (ref 12.5–16.5)
IMMATURE GRANULOCYTES #: 0.02 E9/L
IMMATURE GRANULOCYTES %: 0.4 % (ref 0–5)
LDL CHOLESTEROL CALCULATED: 72 MG/DL (ref 0–99)
LYMPHOCYTES ABSOLUTE: 1.89 E9/L (ref 1.5–4)
LYMPHOCYTES RELATIVE PERCENT: 36.4 % (ref 20–42)
MAGNESIUM: 2.3 MG/DL (ref 1.6–2.6)
MCH RBC QN AUTO: 26.9 PG (ref 26–35)
MCHC RBC AUTO-ENTMCNC: 30 % (ref 32–34.5)
MCV RBC AUTO: 89.6 FL (ref 80–99.9)
MONOCYTES ABSOLUTE: 1.31 E9/L (ref 0.1–0.95)
MONOCYTES RELATIVE PERCENT: 25.2 % (ref 2–12)
NEUTROPHILS ABSOLUTE: 1.93 E9/L (ref 1.8–7.3)
NEUTROPHILS RELATIVE PERCENT: 37.2 % (ref 43–80)
PDW BLD-RTO: 16.7 FL (ref 11.5–15)
PLATELET # BLD: 208 E9/L (ref 130–450)
PMV BLD AUTO: 10.3 FL (ref 7–12)
POTASSIUM SERPL-SCNC: 4.3 MMOL/L (ref 3.5–5)
PROSTATE SPECIFIC ANTIGEN: 7.69 NG/ML (ref 0–4)
RBC # BLD: 4.61 E12/L (ref 3.8–5.8)
SODIUM BLD-SCNC: 146 MMOL/L (ref 132–146)
TOTAL PROTEIN: 7.3 G/DL (ref 6.4–8.3)
TRIGLYCERIDE, FASTING: 83 MG/DL (ref 0–149)
TSH SERPL DL<=0.05 MIU/L-ACNC: 0.54 UIU/ML (ref 0.27–4.2)
VITAMIN D 25-HYDROXY: 25 NG/ML (ref 30–100)
VLDLC SERPL CALC-MCNC: 17 MG/DL
WBC # BLD: 5.2 E9/L (ref 4.5–11.5)

## 2020-05-05 PROCEDURE — 82306 VITAMIN D 25 HYDROXY: CPT

## 2020-05-05 PROCEDURE — 83735 ASSAY OF MAGNESIUM: CPT

## 2020-05-05 PROCEDURE — 80053 COMPREHEN METABOLIC PANEL: CPT

## 2020-05-05 PROCEDURE — 36415 COLL VENOUS BLD VENIPUNCTURE: CPT

## 2020-05-05 PROCEDURE — 80061 LIPID PANEL: CPT

## 2020-05-05 PROCEDURE — 85025 COMPLETE CBC W/AUTO DIFF WBC: CPT

## 2020-05-05 PROCEDURE — 84443 ASSAY THYROID STIM HORMONE: CPT

## 2020-05-05 PROCEDURE — 84153 ASSAY OF PSA TOTAL: CPT

## 2020-05-07 ENCOUNTER — OFFICE VISIT (OUTPATIENT)
Dept: PRIMARY CARE CLINIC | Age: 85
End: 2020-05-07
Payer: MEDICARE

## 2020-05-07 VITALS
TEMPERATURE: 97.9 F | WEIGHT: 155 LBS | DIASTOLIC BLOOD PRESSURE: 68 MMHG | HEART RATE: 68 BPM | OXYGEN SATURATION: 98 % | HEIGHT: 70 IN | BODY MASS INDEX: 22.19 KG/M2 | SYSTOLIC BLOOD PRESSURE: 124 MMHG

## 2020-05-07 PROCEDURE — 1123F ACP DISCUSS/DSCN MKR DOCD: CPT | Performed by: INTERNAL MEDICINE

## 2020-05-07 PROCEDURE — G8427 DOCREV CUR MEDS BY ELIG CLIN: HCPCS | Performed by: INTERNAL MEDICINE

## 2020-05-07 PROCEDURE — G8420 CALC BMI NORM PARAMETERS: HCPCS | Performed by: INTERNAL MEDICINE

## 2020-05-07 PROCEDURE — 4040F PNEUMOC VAC/ADMIN/RCVD: CPT | Performed by: INTERNAL MEDICINE

## 2020-05-07 PROCEDURE — 1036F TOBACCO NON-USER: CPT | Performed by: INTERNAL MEDICINE

## 2020-05-07 PROCEDURE — 99214 OFFICE O/P EST MOD 30 MIN: CPT | Performed by: INTERNAL MEDICINE

## 2020-05-07 RX ORDER — FERROUS SULFATE 325(65) MG
325 TABLET ORAL SEE ADMIN INSTRUCTIONS
Qty: 60 TABLET | Refills: 5 | Status: SHIPPED
Start: 2020-05-07 | End: 2020-11-04

## 2020-05-07 RX ORDER — SIMVASTATIN 40 MG
40 TABLET ORAL NIGHTLY
Qty: 90 TABLET | Refills: 1 | Status: SHIPPED
Start: 2020-05-07 | End: 2020-11-04 | Stop reason: SDUPTHER

## 2020-05-07 RX ORDER — TAMSULOSIN HYDROCHLORIDE 0.4 MG/1
0.4 CAPSULE ORAL DAILY
Qty: 30 CAPSULE | Refills: 5 | Status: SHIPPED
Start: 2020-05-07 | End: 2020-06-30 | Stop reason: ALTCHOICE

## 2020-05-07 RX ORDER — LEVOTHYROXINE SODIUM 0.1 MG/1
100 TABLET ORAL DAILY
Qty: 90 TABLET | Refills: 1 | Status: SHIPPED
Start: 2020-05-07 | End: 2020-11-04 | Stop reason: SDUPTHER

## 2020-05-07 RX ORDER — TRIAMCINOLONE ACETONIDE 1 MG/G
CREAM TOPICAL 2 TIMES DAILY PRN
Qty: 80 G | Refills: 0 | Status: SHIPPED
Start: 2020-05-07 | End: 2020-10-14 | Stop reason: SDUPTHER

## 2020-05-07 ASSESSMENT — PATIENT HEALTH QUESTIONNAIRE - PHQ9
SUM OF ALL RESPONSES TO PHQ QUESTIONS 1-9: 0
SUM OF ALL RESPONSES TO PHQ QUESTIONS 1-9: 0
1. LITTLE INTEREST OR PLEASURE IN DOING THINGS: 0
SUM OF ALL RESPONSES TO PHQ9 QUESTIONS 1 & 2: 0
2. FEELING DOWN, DEPRESSED OR HOPELESS: 0

## 2020-05-07 ASSESSMENT — ENCOUNTER SYMPTOMS
BLOOD IN STOOL: 0
SORE THROAT: 0
EYE PAIN: 0
VOMITING: 0
DIARRHEA: 0
CONSTIPATION: 0
RHINORRHEA: 1
NAUSEA: 0
CHEST TIGHTNESS: 0
ABDOMINAL PAIN: 0
SHORTNESS OF BREATH: 0
COUGH: 0

## 2020-05-07 NOTE — PROGRESS NOTES
suicidal ideas. The patient is not nervous/anxious. Current Outpatient Medications:     triamcinolone (KENALOG) 0.1 % cream, Apply topically 2 times daily as needed (rash) Indications: PRN Apply topically 2 times daily. , Disp: 80 g, Rfl: 0    simvastatin (ZOCOR) 40 MG tablet, Take 1 tablet by mouth nightly, Disp: 90 tablet, Rfl: 1    levothyroxine (SYNTHROID) 100 MCG tablet, Take 1 tablet by mouth Daily, Disp: 90 tablet, Rfl: 1    ferrous sulfate (IRON 325) 325 (65 Fe) MG tablet, Take 1 tablet by mouth See Admin Instructions Take Monday Wednesday and Friday Can take colace (stool) softener daily with medication, Disp: 60 tablet, Rfl: 5    tamsulosin (FLOMAX) 0.4 MG capsule, Take 1 capsule by mouth daily, Disp: 30 capsule, Rfl: 5    Cholecalciferol (VITAMIN D3) 1000 units CAPS, Take by mouth daily, Disp: , Rfl:     No Known Allergies    Past Medical History:   Diagnosis Date    Enlarged prostate     Hyperlipidemia     Lower esophageal ring (Schatzki) 5/2/2019    Mixed hyperlipidemia 5/2/2019    Thyroid disease        Past Surgical History:   Procedure Laterality Date    ESOPHAGUS SURGERY      x 3    TOOTH EXTRACTION         No family history on file. Social History     Socioeconomic History    Marital status:       Spouse name: Not on file    Number of children: Not on file    Years of education: Not on file    Highest education level: Not on file   Occupational History    Not on file   Social Needs    Financial resource strain: Not on file    Food insecurity     Worry: Not on file     Inability: Not on file    Transportation needs     Medical: Not on file     Non-medical: Not on file   Tobacco Use    Smoking status: Never Smoker    Smokeless tobacco: Never Used   Substance and Sexual Activity    Alcohol use: No    Drug use: No    Sexual activity: Not on file   Lifestyle    Physical activity     Days per week: Not on file     Minutes per session: Not on file    Stress: Assessment and Plan:    Leanne Mooney was seen today for hyperlipidemia, thyroid problem, discuss labs and health maintenance. Diagnoses and all orders for this visit:    Mixed hyperlipidemia  - Continue present treatment   - watch diet   - on simvastatin   - follow labs     Acquired hypothyroidism  - Continue present treatment   - on levothyroxine 100mcg   - follow labs (last 5/2020) - borderline underactive      Chronic kidney disease, stage 3 ([address]  - Continue present treatment   - follow labs   - avoid NSAIDs    Anemia, chronic disease  - Continue present treatment   - follow labs   - monitor for bleeding   - b12 was normal   - iron borderline low   - recommend iron 3x per week   - follow labs     Lower esophageal ring (Schatzki)  - Continue present treatment   - last EGD (2019)   - declined PPI     Vitamin D deficiency  - continue present treatment   - otc supplement   - follow labs     Primary generalized (osteo)arthritis  - Continue present treatment   - avoid NSAIDs   - tylenol as needed  - Disability placard letter given to patient   - stable     Elevated PSA  - uncertain as to reason - BPH or other   - uncertain as to utility of test was elevated in pat - normal last year and now elevated again   - PSA again elevated (5/2020)   - declines urology or further evaluation - understands risks   - trial of flomax - side effects and risks discussed including orthostasis     Return in about 6 months (around 11/7/2020) for check up and review and labs.     Orders Placed This Encounter   Procedures    CBC Auto Differential     Standing Status:   Future     Standing Expiration Date:   5/7/2021    Comprehensive Metabolic Panel     Standing Status:   Future     Standing Expiration Date:   5/7/2021    Lipid, Fasting     Standing Status:   Future     Standing Expiration Date:   5/7/2021    Magnesium     Standing Status:   Future     Standing Expiration Date:   5/7/2021    Iron and TIBC     Standing Status:

## 2020-06-18 ENCOUNTER — PREP FOR PROCEDURE (OUTPATIENT)
Dept: GASTROENTEROLOGY | Age: 85
End: 2020-06-18

## 2020-06-18 RX ORDER — SODIUM CHLORIDE 0.9 % (FLUSH) 0.9 %
10 SYRINGE (ML) INJECTION EVERY 12 HOURS SCHEDULED
Status: CANCELLED | OUTPATIENT
Start: 2020-06-18

## 2020-06-18 RX ORDER — SODIUM CHLORIDE 0.9 % (FLUSH) 0.9 %
10 SYRINGE (ML) INJECTION PRN
Status: CANCELLED | OUTPATIENT
Start: 2020-06-18

## 2020-06-18 RX ORDER — SODIUM CHLORIDE 9 MG/ML
INJECTION, SOLUTION INTRAVENOUS CONTINUOUS
Status: CANCELLED | OUTPATIENT
Start: 2020-06-18

## 2020-06-18 NOTE — H&P (VIEW-ONLY)
Andrade Arellanoder      : 1929    Referring Physician:  Adrian Hillman M.D. Problem:  Dysphagia. Subjective  Mr. Vanessa Berrios has been seen in this office previously. He had been seen for the first time in this office at the age of 80 back in 2018. He had undergone endoscopic evaluations elsewhere by 2 separate physicians with improvement of a year or slightly longer. When I evaluated him endoscopically, there was a ring. It was dilated over a guidewire to 54-Syriac. In the interim, he apparently had recurrent symptoms and was seen through the office of Dr. Lety Cabrera, who, in February of last year, repeated an endoscopic evaluation. He returns to this office with 2 episodes of dysphagia for solids lasting for up to a ½ hour. He is otherwise well. PMH/Surgical Hx:  Hyperlipidemia, hypothyroidism and BPH. He has had EGD and colonoscopies. SH:   He is . He does not smoke or use alcohol. Current Medications:  Thyroid replacement, simvastatin, vitamin D and tamsulosin. Allergies:  NKDA. Objective  Weight 157 pounds, which is up from 148 pounds when last seen here. Blood pressure 150/88. His pulse is regular. Temperature is 99 degrees. Examination of the head, ears, eyes, nose and throat reveals the absence of pallor or scleral icterus. No neck vein elevation or adenopathy. Lungs clear. Heart tones normal.  Regular rate and regular rhythm. No audible murmur or gallop. Soft abdomen. Nontender. No hepatic or splenic enlargement, ascites or dependent edema. A digital rectal exam was not done. Assessment  Symptomatic ring. Despite adequate dilatation, symptoms rapidly recur, but there is no doubt this is a benign situation. Plan  EGD. [id number] [address] 2020 at 9:30.

## 2020-06-30 NOTE — PROGRESS NOTES
[facility] PRE-ADMISSION TESTING INSTRUCTIONS    The Preadmission Testing patient is instructed accordingly using the following criteria (check applicable):    ARRIVAL INSTRUCTIONS:  [x] Parking the day of Surgery is located in the Main Entrance lot. Upon entering the door, make an immediate right to the surgery reception desk    [] [phone] is available Monday through Friday 6 am to 6 pm    [x] Bring photo ID and insurance card    [] Bring in a copy of Living will or Durable Power of  papers. [x] Please be sure to arrange for responsible adult to provide transportation to and from the hospital    [x] Please arrange for responsible adult to be with you for the 24 hour period post procedure due to having anesthesia      GENERAL INSTRUCTIONS:    [x] Nothing by mouth after midnight, including gum, candy, mints or water    [x] You may brush your teeth, but do not swallow any water    [] Take medications as instructed with 1-2 oz of water    [x] Stop herbal supplements and vitamins 5 days prior to procedure    [] Follow preop dosing of blood thinners per physician instructions    [] Take 1/2 dose of evening insulin, but no insulin after midnight    [] No oral diabetic medications after midnight    [] If diabetic and have low blood sugar or feel symptomatic, take 1-2oz apple juice only    [] Bring inhalers day of surgery    [] Bring C-PAP/ Bi-Pap day of surgery    [] Bring urine specimen day of surgery    [] Shower or bath with soap, lather and rinse well, AM of Surgery, no lotion, powders or creams to surgical site    [] Follow bowel prep as instructed per surgeon    [x] No tobacco products within 24 hours of surgery     [x] No alcohol or illegal drug use within 24 hours of surgery.     [x] Jewelry, body piercing's, eyeglasses, contact lenses and dentures are not permitted into surgery (bring cases)      [] Please do not wear any nail polish, make up or hair

## 2020-06-30 NOTE — PROGRESS NOTES
Have you been tested for COVID  No    (pt going 7/2/2020)       Have you been told you were positive for COVID No  Have you had any known exposure to someone that is positive for COVID No  Do you have a cough                   No              Do you have shortness of breath No                 Do you have a sore throat            No                Are you having chills                    No                Are you having muscle aches. No                    Please come to the hospital wearing a mask and have your significant other wear a mask as well. Both of you should check your temperature before leaving to come here,  if it is 100 or higher please call [phone] for instruction.

## 2020-07-02 ENCOUNTER — HOSPITAL ENCOUNTER (OUTPATIENT)
Age: 85
Discharge: HOME | End: 2020-07-04
Payer: MEDICARE

## 2020-07-02 PROCEDURE — U0003 INFECTIOUS AGENT DETECTION BY NUCLEIC ACID (DNA OR RNA); SEVERE ACUTE RESPIRATORY SYNDROME CORONAVIRUS 2 (SARS-COV-2) (CORONAVIRUS DISEASE [COVID-19]), AMPLIFIED PROBE TECHNIQUE, MAKING USE OF HIGH THROUGHPUT TECHNOLOGIES AS DESCRIBED BY CMS-2020-01-R: HCPCS

## 2020-07-03 LAB
SARS-COV-2: NOT DETECTED
SOURCE: NORMAL

## 2020-07-07 ENCOUNTER — ANESTHESIA EVENT (OUTPATIENT)
Dept: ENDOSCOPY | Age: 85
End: 2020-07-07
Payer: MEDICARE

## 2020-07-07 ENCOUNTER — HOSPITAL ENCOUNTER (OUTPATIENT)
Age: 85
Setting detail: OUTPATIENT SURGERY
Discharge: HOME | End: 2020-07-07
Attending: INTERNAL MEDICINE | Admitting: INTERNAL MEDICINE
Payer: MEDICARE

## 2020-07-07 ENCOUNTER — ANESTHESIA (OUTPATIENT)
Dept: ENDOSCOPY | Age: 85
End: 2020-07-07
Payer: MEDICARE

## 2020-07-07 VITALS
RESPIRATION RATE: 16 BRPM | WEIGHT: 150 LBS | BODY MASS INDEX: 21.47 KG/M2 | SYSTOLIC BLOOD PRESSURE: 128 MMHG | HEART RATE: 56 BPM | TEMPERATURE: 96.4 F | OXYGEN SATURATION: 93 % | HEIGHT: 70 IN | DIASTOLIC BLOOD PRESSURE: 65 MMHG

## 2020-07-07 VITALS
RESPIRATION RATE: 19 BRPM | OXYGEN SATURATION: 95 % | SYSTOLIC BLOOD PRESSURE: 111 MMHG | DIASTOLIC BLOOD PRESSURE: 58 MMHG

## 2020-07-07 PROCEDURE — 2709999900 HC NON-CHARGEABLE SUPPLY: Performed by: INTERNAL MEDICINE

## 2020-07-07 PROCEDURE — 3700000001 HC ADD 15 MINUTES (ANESTHESIA): Performed by: INTERNAL MEDICINE

## 2020-07-07 PROCEDURE — 3609017700 HC EGD DILATION GASTRIC/DUODENAL STRICTURE: Performed by: INTERNAL MEDICINE

## 2020-07-07 PROCEDURE — 2580000003 HC RX 258: Performed by: INTERNAL MEDICINE

## 2020-07-07 PROCEDURE — 7100000011 HC PHASE II RECOVERY - ADDTL 15 MIN: Performed by: INTERNAL MEDICINE

## 2020-07-07 PROCEDURE — 6360000002 HC RX W HCPCS: Performed by: NURSE ANESTHETIST, CERTIFIED REGISTERED

## 2020-07-07 PROCEDURE — 7100000010 HC PHASE II RECOVERY - FIRST 15 MIN: Performed by: INTERNAL MEDICINE

## 2020-07-07 PROCEDURE — 3700000000 HC ANESTHESIA ATTENDED CARE: Performed by: INTERNAL MEDICINE

## 2020-07-07 RX ORDER — SODIUM CHLORIDE 9 MG/ML
INJECTION, SOLUTION INTRAVENOUS CONTINUOUS
Status: DISCONTINUED | OUTPATIENT
Start: 2020-07-07 | End: 2020-07-07 | Stop reason: HOSPADM

## 2020-07-07 RX ORDER — PROPOFOL 10 MG/ML
INJECTION, EMULSION INTRAVENOUS PRN
Status: DISCONTINUED | OUTPATIENT
Start: 2020-07-07 | End: 2020-07-07 | Stop reason: SDUPTHER

## 2020-07-07 RX ORDER — SODIUM CHLORIDE 0.9 % (FLUSH) 0.9 %
10 SYRINGE (ML) INJECTION PRN
Status: DISCONTINUED | OUTPATIENT
Start: 2020-07-07 | End: 2020-07-07 | Stop reason: HOSPADM

## 2020-07-07 RX ORDER — SODIUM CHLORIDE 0.9 % (FLUSH) 0.9 %
10 SYRINGE (ML) INJECTION EVERY 12 HOURS SCHEDULED
Status: DISCONTINUED | OUTPATIENT
Start: 2020-07-07 | End: 2020-07-07 | Stop reason: HOSPADM

## 2020-07-07 RX ADMIN — SODIUM CHLORIDE: 9 INJECTION, SOLUTION INTRAVENOUS at 09:22

## 2020-07-07 RX ADMIN — PROPOFOL 100 MG: 10 INJECTION, EMULSION INTRAVENOUS at 09:31

## 2020-07-07 ASSESSMENT — PAIN SCALES - GENERAL
PAINLEVEL_OUTOF10: 0
PAINLEVEL_OUTOF10: 0

## 2020-07-07 ASSESSMENT — PAIN DESCRIPTION - LOCATION
LOCATION: ABDOMEN

## 2020-07-07 ASSESSMENT — PAIN - FUNCTIONAL ASSESSMENT: PAIN_FUNCTIONAL_ASSESSMENT: 0-10

## 2020-07-07 NOTE — ANESTHESIA PRE PROCEDURE
Department of Anesthesiology  Preprocedure Note       Name:  Katie Stone   Age:  80 y.o.  :  1929                                          MRN:  [id number]         Date:  2020      Surgeon: Ron Vaca):  Abiodun Ayoub MD    Procedure: Procedure(s):  EGD WITH ESOPHAGEAL  DILATATION    Medications prior to admission:   Prior to Admission medications    Medication Sig Start Date End Date Taking? Authorizing Provider   triamcinolone (KENALOG) 0.1 % cream Apply topically 2 times daily as needed (rash) Indications: PRN Apply topically 2 times daily.  20  Yes Leslie Crowley MD   simvastatin (ZOCOR) 40 MG tablet Take 1 tablet by mouth nightly 20  Yes Leslie Crowley MD   levothyroxine (SYNTHROID) 100 MCG tablet Take 1 tablet by mouth Daily 20  Yes Leslie Crowley MD   ferrous sulfate (IRON 325) 325 (65 Fe) MG tablet Take 1 tablet by mouth See Admin Instructions Take  and Friday Can take colace (stool) softener daily with medication 20  Yes Leslie Crowley MD   Cholecalciferol (VITAMIN D3) 1000 units CAPS Take by mouth daily Ld 2020    Historical Provider, MD       Current medications:    Current Facility-Administered Medications   Medication Dose Route Frequency Provider Last Rate Last Dose    0.9 % sodium chloride infusion   Intravenous Continuous Abiodun Ayoub MD        sodium chloride flush 0.9 % injection 10 mL  10 mL Intravenous 2 times per day Abiodun Ayoub MD        sodium chloride flush 0.9 % injection 10 mL  10 mL Intravenous PRN Abiodun Ayoub MD           Allergies:  No Known Allergies    Problem List:    Patient Active Problem List   Diagnosis Code    Chronic right shoulder pain M25.511, G89.29    Primary generalized (osteo)arthritis M15.0    Acquired hypothyroidism E03.9    Encounter for immunization Z23    Prostate enlargement N40.0    Fatigue R53.83    Esophageal dysphagia R13.10    Immunization counseling Z71.89    Anemia, chronic disease D63.8    Chronic kidney disease, stage 3 ([facility]) N18.3    Mixed hyperlipidemia E78.2    Lower esophageal ring (Schatzki) K22.2    Vitamin D deficiency E55.9    Elevated PSA R97.20       Past Medical History:        Diagnosis Date    Arthritis     Enlarged prostate     Hyperlipidemia     Lower esophageal ring (Schatzki) 5/2/2019    Mixed hyperlipidemia 5/2/2019    Thyroid disease        Past Surgical History:        Procedure Laterality Date    CATARACT REMOVAL WITH IMPLANT Bilateral     ESOPHAGUS SURGERY      x 3    TOOTH EXTRACTION         Social History:    Social History     Tobacco Use    Smoking status: Never Smoker    Smokeless tobacco: Never Used   Substance Use Topics    Alcohol use: No                                Counseling given: Not Answered      Vital Signs (Current):   Vitals:    06/30/20 1552 07/07/20 0850   BP:  (!) 171/83   Pulse:  74   Resp:  20   Temp:  97.7 °F (36.5 °C)   TempSrc:  Temporal   SpO2:  97%   Weight: 150 lb (68 kg) 150 lb (68 kg)   Height: 5' 10\" (1.778 m) 5' 10\" (1.778 m)                                              BP Readings from Last 3 Encounters:   07/07/20 (!) 171/83   05/07/20 124/68   11/21/19 138/80       NPO Status: Time of last liquid consumption: 2230                        Time of last solid consumption: 2230                        Date of last liquid consumption: 07/06/20                        Date of last solid food consumption: 07/06/20    BMI:   Wt Readings from Last 3 Encounters:   07/07/20 150 lb (68 kg)   05/07/20 155 lb (70.3 kg)   11/21/19 153 lb (69.4 kg)     Body mass index is 21.52 kg/m².     CBC:   Lab Results   Component Value Date    WBC 5.2 05/05/2020    RBC 4.61 05/05/2020    HGB 12.4 05/05/2020    HCT 41.3 05/05/2020    MCV 89.6 05/05/2020    RDW 16.7 05/05/2020     05/05/2020       CMP:   Lab Results   Component Value Date     05/05/2020    K 4.3 05/05/2020     05/05/2020    CO2 24 05/05/2020    BUN 16 05/05/2020    CREATININE 1.4 05/05/2020    GFRAA 57 05/05/2020    LABGLOM 47 05/05/2020    GLUCOSE 93 05/05/2020    PROT 7.3 05/05/2020    CALCIUM 9.8 05/05/2020    BILITOT 0.5 05/05/2020    ALKPHOS 57 05/05/2020    AST 17 05/05/2020    ALT 10 05/05/2020       POC Tests: No results for input(s): POCGLU, POCNA, POCK, POCCL, POCBUN, POCHEMO, POCHCT in the last 72 hours. Coags: No results found for: PROTIME, INR, APTT    HCG (If Applicable): No results found for: PREGTESTUR, PREGSERUM, HCG, HCGQUANT     ABGs: No results found for: PHART, PO2ART, DML4VMH, KTI9PCZ, BEART, P4UHBXYT     Type & Screen (If Applicable):  No results found for: LABABO, LABRH    Drug/Infectious Status (If Applicable):  No results found for: HIV, HEPCAB    COVID-19 Screening (If Applicable):   Lab Results   Component Value Date    COVID19 Not Detected 07/02/2020         Anesthesia Evaluation  Patient summary reviewed no history of anesthetic complications:   Airway: Mallampati: I  TM distance: >3 FB   Neck ROM: full   Dental:    (+) caps  Comment: bridge    Pulmonary:Negative Pulmonary ROS breath sounds clear to auscultation                             Cardiovascular:    (+) hyperlipidemia        Rhythm: regular  Rate: normal           Beta Blocker:  Not on Beta Blocker         Neuro/Psych:   Negative Neuro/Psych ROS              GI/Hepatic/Renal:   (+) renal disease: CRI,          ROS comment: Lower esophageal ring (Schatzki)  Enlarged prostate  Esophageal dysphagia. Endo/Other:    (+) hypothyroidism, blood dyscrasia: anemia, arthritis: OA., .                 Abdominal:           Vascular: negative vascular ROS. Anesthesia Plan      MAC     ASA 3       Induction: intravenous. Anesthetic plan and risks discussed with patient. Plan discussed with CRNA.                   Yolanda Lemon MD   7/7/2020

## 2020-07-07 NOTE — BRIEF OP NOTE
Brief Postoperative Note      Patient: Shady Martinez  YOB: 1929  MRN: [id number]    Date of Procedure: 7/7/2020    Pre-Op Diagnosis: ESOPHAGEAL ring    Post-Op Diagnosis: Same + gastritis and duodenitis       Procedure(s):  EGD WITH ESOPHAGEAL  DILATATION Amira Meek 44,48, 54 Fr    Surgeon(s):  Delmy Bowman MD    Assistant:  * No surgical staff found *    Anesthesia: Monitor Anesthesia Care    Estimated Blood Loss (mL): Minimal    Complications: None immediate    Specimens:   * No specimens in log *    Implants:  * No implants in log *      Drains: * No LDAs found *    Findings: symptomatic ring w/o significant hernia    Electronically signed by Delmy Bowman MD on 7/7/2020 at 9:38 AM

## 2020-07-07 NOTE — ANESTHESIA POSTPROCEDURE EVALUATION
Department of Anesthesiology  Postprocedure Note    Patient: Lang Cordero  MRN: [id number]  YOB: 1929  Date of evaluation: 7/7/2020  Time:  10:19 AM     Procedure Summary     Date:  07/07/20 Room / Location:  [facility] / [facility]    Anesthesia Start:  1477 Anesthesia Stop:  9946    Procedure:  EGD WITH ESOPHAGEAL  DILATATION (N/A ) Diagnosis:  (ESOPHAGEAL STICTURE)    Surgeon:  Rosa Marx MD Responsible Provider:  Gabriel Rosado MD    Anesthesia Type:  MAC ASA Status:  3          Anesthesia Type: MAC    Letitia Phase I: Letitia Score: 9    Letitia Phase II: Letitia Score: 10    Last vitals: Reviewed and per EMR flowsheets.        Anesthesia Post Evaluation    Patient location during evaluation: PACU  Patient participation: complete - patient participated  Level of consciousness: awake and alert  Airway patency: patent  Nausea & Vomiting: no nausea and no vomiting  Complications: no  Cardiovascular status: hemodynamically stable  Respiratory status: acceptable  Hydration status: euvolemic

## 2020-07-07 NOTE — PROGRESS NOTES
1007 discharge instructions given to pt and his daughter and they both verbalized understanding of instructions

## 2020-07-07 NOTE — INTERVAL H&P NOTE
Update History & Physical    The patient's History and Physical of June 18, 2020 was reviewed with the patient and I examined the patient. There was no change. The surgical site was confirmed by the patient and me. Plan: The risks, benefits, expected outcome, and alternative to the recommended procedure have been discussed with the patient. Patient understands and wants to proceed with the procedure.      Electronically signed by Shahbaz Jhaveri MD on 7/7/2020 at 9:25 AM

## 2020-07-08 NOTE — OP NOTE
[address] [address]                                OPERATIVE REPORT    PATIENT NAME: Clare Stacy                    :        1929  MED REC NO:   [id number]                            ROOM:  ACCOUNT NO:   [de-identified]                           ADMIT DATE: 2020  PROVIDER:     Viji Palm MD    DATE OF PROCEDURE:  2020    PROCEDURES PERFORMED:  Esophagogastroduodenoscopy plus esophageal  dilatation. PREOPERATIVE DIAGNOSIS:  Schatzki's ring. POSTOPERATIVE DIAGNOSES:  1.  Schatzki's ring. 2.  Essentially no hiatal hernia. 3.  Nonspecific antral and duodenal erythema without erosions or  ulcerations. INDICATIONS:  This is a gentleman 80years of age. I had seen him back  in 2018 for dysphagia. He had previously undergone endoscopic  evaluations elsewhere. The issue was solid food dysphagia. He was  dilated to 54-Portuguese. Recurrent symptoms a year later, he went  elsewhere, and in 2019 he underwent another endoscopic evaluation  with dilatation with relief but symptoms have rapidly recurred. There  has been no weight loss, and he has no issues with heartburn. Preop is monitored anesthesia care. DESCRIPTION OF PROCEDURE:  With the patient in the left lateral  decubitus position, sedation was given. Bite block was in place. The  Olympus video endoscope was guided into the cervical esophagus under  direct vision. Proximal and mid esophagus normal.  There was a ring at  the gastroesophageal junction without complicating features. The hiatal  hernia was not more than 1 to 2 cm in size. Retroflexion revealed a  normal fundus and cardia. There was nonspecific punctate erythema in  the distal third of the stomach and some streaky erythema in the  duodenal bulb, but the postbulbar duodenum was normal, and no mucosal  breaks were identified.   The endoscope was withdrawn. Lubricated Dupont dilators were passed beginning at 40, then 48, and  ultimately 54-Colombian. Passed easily. No resistance. No blood  returned. Procedure terminated. EBL: 0    IMPRESSION:  He has been dilated on successive occasions. He had  symptomatic relief but symptoms ultimately recur with more rapidity than  usually is the case. Periodic dilatation as required.         Preeti Albarado MD    D: 07/08/2020 7:49:16       T: 07/08/2020 7:55:56     [email or web address]  Job#: [id number]     Doc#: [id number]    CC:

## 2020-10-14 RX ORDER — TRIAMCINOLONE ACETONIDE 1 MG/G
CREAM TOPICAL 2 TIMES DAILY PRN
Qty: 80 G | Refills: 0 | Status: SHIPPED
Start: 2020-10-14 | End: 2021-04-29 | Stop reason: ALTCHOICE

## 2020-11-04 ENCOUNTER — OFFICE VISIT (OUTPATIENT)
Dept: FAMILY MEDICINE CLINIC | Age: 85
End: 2020-11-04
Payer: MEDICARE

## 2020-11-04 VITALS
BODY MASS INDEX: 21.92 KG/M2 | WEIGHT: 153.13 LBS | SYSTOLIC BLOOD PRESSURE: 160 MMHG | DIASTOLIC BLOOD PRESSURE: 80 MMHG | TEMPERATURE: 98 F | HEART RATE: 83 BPM | OXYGEN SATURATION: 98 % | HEIGHT: 70 IN

## 2020-11-04 DIAGNOSIS — E78.2 MIXED HYPERLIPIDEMIA: ICD-10-CM

## 2020-11-04 DIAGNOSIS — D63.8 ANEMIA, CHRONIC DISEASE: ICD-10-CM

## 2020-11-04 DIAGNOSIS — N40.0 PROSTATE ENLARGEMENT: ICD-10-CM

## 2020-11-04 DIAGNOSIS — E55.9 VITAMIN D DEFICIENCY: ICD-10-CM

## 2020-11-04 DIAGNOSIS — R97.20 ELEVATED PSA: ICD-10-CM

## 2020-11-04 DIAGNOSIS — E03.9 ACQUIRED HYPOTHYROIDISM: ICD-10-CM

## 2020-11-04 PROBLEM — F33.1 MODERATE EPISODE OF RECURRENT MAJOR DEPRESSIVE DISORDER (HCC): Status: ACTIVE | Noted: 2020-11-04

## 2020-11-04 LAB
ALBUMIN SERPL-MCNC: 4.1 G/DL (ref 3.5–5.2)
ALP BLD-CCNC: 56 U/L (ref 40–129)
ALT SERPL-CCNC: 7 U/L (ref 0–40)
ANION GAP SERPL CALCULATED.3IONS-SCNC: 13 MMOL/L (ref 7–16)
ANISOCYTOSIS: ABNORMAL
AST SERPL-CCNC: 19 U/L (ref 0–39)
BACTERIA: NORMAL /HPF
BASOPHILS ABSOLUTE: 0.05 E9/L (ref 0–0.2)
BASOPHILS RELATIVE PERCENT: 0.9 % (ref 0–2)
BILIRUB SERPL-MCNC: 0.3 MG/DL (ref 0–1.2)
BILIRUBIN URINE: NEGATIVE
BLOOD, URINE: ABNORMAL
BUN BLDV-MCNC: 22 MG/DL (ref 8–23)
BURR CELLS: ABNORMAL
CALCIUM SERPL-MCNC: 9.8 MG/DL (ref 8.6–10.2)
CHLORIDE BLD-SCNC: 108 MMOL/L (ref 98–107)
CHOLESTEROL, FASTING: 120 MG/DL (ref 0–199)
CLARITY: CLEAR
CO2: 23 MMOL/L (ref 22–29)
COLOR: YELLOW
CREAT SERPL-MCNC: 1.4 MG/DL (ref 0.7–1.2)
EOSINOPHILS ABSOLUTE: 0 E9/L (ref 0.05–0.5)
EOSINOPHILS RELATIVE PERCENT: 0.2 % (ref 0–6)
FOLATE: >20 NG/ML (ref 4.8–24.2)
GFR AFRICAN AMERICAN: 57
GFR NON-AFRICAN AMERICAN: 47 ML/MIN/1.73
GLUCOSE BLD-MCNC: 75 MG/DL (ref 74–99)
GLUCOSE URINE: NEGATIVE MG/DL
HCT VFR BLD CALC: 38.8 % (ref 37–54)
HDLC SERPL-MCNC: 50 MG/DL
HEMOGLOBIN: 11.2 G/DL (ref 12.5–16.5)
IRON SATURATION: 21 % (ref 20–55)
IRON: 69 MCG/DL (ref 59–158)
KETONES, URINE: NEGATIVE MG/DL
LDL CHOLESTEROL CALCULATED: 58 MG/DL (ref 0–99)
LEUKOCYTE ESTERASE, URINE: ABNORMAL
LYMPHOCYTES ABSOLUTE: 1.72 E9/L (ref 1.5–4)
LYMPHOCYTES RELATIVE PERCENT: 33 % (ref 20–42)
MAGNESIUM: 2.3 MG/DL (ref 1.6–2.6)
MCH RBC QN AUTO: 26.4 PG (ref 26–35)
MCHC RBC AUTO-ENTMCNC: 28.9 % (ref 32–34.5)
MCV RBC AUTO: 91.5 FL (ref 80–99.9)
MONOCYTES ABSOLUTE: 1.72 E9/L (ref 0.1–0.95)
MONOCYTES RELATIVE PERCENT: 33 % (ref 2–12)
NEUTROPHILS ABSOLUTE: 1.72 E9/L (ref 1.8–7.3)
NEUTROPHILS RELATIVE PERCENT: 33 % (ref 43–80)
NITRITE, URINE: NEGATIVE
OVALOCYTES: ABNORMAL
PDW BLD-RTO: 18 FL (ref 11.5–15)
PH UA: 6 (ref 5–9)
PLATELET # BLD: 223 E9/L (ref 130–450)
PMV BLD AUTO: 11.2 FL (ref 7–12)
POIKILOCYTES: ABNORMAL
POLYCHROMASIA: ABNORMAL
POTASSIUM SERPL-SCNC: 4.9 MMOL/L (ref 3.5–5)
PROSTATE SPECIFIC ANTIGEN: 12.76 NG/ML (ref 0–4)
PROTEIN UA: NEGATIVE MG/DL
RBC # BLD: 4.24 E12/L (ref 3.8–5.8)
RBC UA: NORMAL /HPF (ref 0–2)
SODIUM BLD-SCNC: 144 MMOL/L (ref 132–146)
SPECIFIC GRAVITY UA: 1.02 (ref 1–1.03)
TOTAL IRON BINDING CAPACITY: 334 MCG/DL (ref 250–450)
TOTAL PROTEIN: 7.5 G/DL (ref 6.4–8.3)
TRIGLYCERIDE, FASTING: 60 MG/DL (ref 0–149)
TSH SERPL DL<=0.05 MIU/L-ACNC: 0.34 UIU/ML (ref 0.27–4.2)
UROBILINOGEN, URINE: 0.2 E.U./DL
VITAMIN B-12: 945 PG/ML (ref 211–946)
VITAMIN D 25-HYDROXY: 25 NG/ML (ref 30–100)
VLDLC SERPL CALC-MCNC: 12 MG/DL
WBC # BLD: 5.2 E9/L (ref 4.5–11.5)
WBC UA: NORMAL /HPF (ref 0–5)

## 2020-11-04 PROCEDURE — 4040F PNEUMOC VAC/ADMIN/RCVD: CPT | Performed by: INTERNAL MEDICINE

## 2020-11-04 PROCEDURE — G0444 DEPRESSION SCREEN ANNUAL: HCPCS | Performed by: INTERNAL MEDICINE

## 2020-11-04 PROCEDURE — G8484 FLU IMMUNIZE NO ADMIN: HCPCS | Performed by: INTERNAL MEDICINE

## 2020-11-04 PROCEDURE — 1123F ACP DISCUSS/DSCN MKR DOCD: CPT | Performed by: INTERNAL MEDICINE

## 2020-11-04 PROCEDURE — G0439 PPPS, SUBSEQ VISIT: HCPCS | Performed by: INTERNAL MEDICINE

## 2020-11-04 RX ORDER — SIMVASTATIN 40 MG
40 TABLET ORAL NIGHTLY
Qty: 90 TABLET | Refills: 1 | Status: SHIPPED
Start: 2020-11-04 | End: 2021-04-30 | Stop reason: SDUPTHER

## 2020-11-04 RX ORDER — LEVOTHYROXINE SODIUM 0.1 MG/1
100 TABLET ORAL DAILY
Qty: 90 TABLET | Refills: 1 | Status: SHIPPED
Start: 2020-11-04 | End: 2021-04-30 | Stop reason: SDUPTHER

## 2020-11-04 ASSESSMENT — PATIENT HEALTH QUESTIONNAIRE - PHQ9
9. THOUGHTS THAT YOU WOULD BE BETTER OFF DEAD, OR OF HURTING YOURSELF: 3
5. POOR APPETITE OR OVEREATING: 1
SUM OF ALL RESPONSES TO PHQ QUESTIONS 1-9: 12
SUM OF ALL RESPONSES TO PHQ QUESTIONS 1-9: 15
4. FEELING TIRED OR HAVING LITTLE ENERGY: 3
SUM OF ALL RESPONSES TO PHQ QUESTIONS 1-9: 15
1. LITTLE INTEREST OR PLEASURE IN DOING THINGS: 0
2. FEELING DOWN, DEPRESSED OR HOPELESS: 3
7. TROUBLE CONCENTRATING ON THINGS, SUCH AS READING THE NEWSPAPER OR WATCHING TELEVISION: 3
6. FEELING BAD ABOUT YOURSELF - OR THAT YOU ARE A FAILURE OR HAVE LET YOURSELF OR YOUR FAMILY DOWN: 1
8. MOVING OR SPEAKING SO SLOWLY THAT OTHER PEOPLE COULD HAVE NOTICED. OR THE OPPOSITE, BEING SO FIGETY OR RESTLESS THAT YOU HAVE BEEN MOVING AROUND A LOT MORE THAN USUAL: 0
3. TROUBLE FALLING OR STAYING ASLEEP: 1
SUM OF ALL RESPONSES TO PHQ9 QUESTIONS 1 & 2: 3
10. IF YOU CHECKED OFF ANY PROBLEMS, HOW DIFFICULT HAVE THESE PROBLEMS MADE IT FOR YOU TO DO YOUR WORK, TAKE CARE OF THINGS AT HOME, OR GET ALONG WITH OTHER PEOPLE: 2

## 2020-11-04 ASSESSMENT — ENCOUNTER SYMPTOMS
ABDOMINAL PAIN: 0
SORE THROAT: 0
NAUSEA: 0
VOMITING: 0
CHEST TIGHTNESS: 0
COUGH: 0
DIARRHEA: 0
CONSTIPATION: 0
SHORTNESS OF BREATH: 0
EYE PAIN: 0
RHINORRHEA: 1
BLOOD IN STOOL: 0

## 2020-11-04 ASSESSMENT — COLUMBIA-SUICIDE SEVERITY RATING SCALE - C-SSRS
2. HAVE YOU ACTUALLY HAD ANY THOUGHTS OF KILLING YOURSELF?: YES
6. HAVE YOU EVER DONE ANYTHING, STARTED TO DO ANYTHING, OR PREPARED TO DO ANYTHING TO END YOUR LIFE?: NO
5. HAVE YOU STARTED TO WORK OUT OR WORKED OUT THE DETAILS OF HOW TO KILL YOURSELF? DO YOU INTEND TO CARRY OUT THIS PLAN?: NO
4. HAVE YOU HAD THESE THOUGHTS AND HAD SOME INTENTION OF ACTING ON THEM?: NO
1. WITHIN THE PAST MONTH, HAVE YOU WISHED YOU WERE DEAD OR WISHED YOU COULD GO TO SLEEP AND NOT WAKE UP?: YES

## 2020-11-04 ASSESSMENT — LIFESTYLE VARIABLES: HOW OFTEN DO YOU HAVE A DRINK CONTAINING ALCOHOL: 0

## 2020-11-04 NOTE — PROGRESS NOTES
[facility]) Physicians   Internal Medicine     2020  Ellis Jordan : 1929 Sex: male  Age:89 y.o. Chief Complaint   Patient presents with    Medicare AWV        HPI:   Patient presents to office for review and management of chronic medical conditions. - Depression screening was positive on office visit (2020). States symptoms of lonely and having no one to talk to causes depression. No current suicidal plans. States has esophageal stricture and ring. Follows with GI. States has had esophageal dilation. Not taking omeprazole. Had EGD () 1. Schatzki's ring s/p dilation, will need periodic dilation. Symptoms improved,     States has high cholesterol. Watching diet. On Simvastatin. No reported side effects. States has hypothyroidism. On levothyroxine. States has arthritis. Has pain to shoulder an ankle. Symptoms are intermittent. Stable. States has chronic kidney disease. Not currently following with nephrology. Discussed medications to avoid. Labs stable. States has anemia. Has had colonoscopy. Following labs - last labs (2020) improved. States stopped taking iron      States has enlarged prostate. PSA was elevated at 7.69. States having urinary frequency. States has vitamin D def. On otc supplement     EGD () 1. Schatzki's ring s/p dilation, will need periodic dilation   2. Essentially no hiatal hernia. 3.  Nonspecific antral and duodenal erythema without erosions or  ulcerations. ROS:  Review of Systems   Constitutional: Negative for appetite change, chills, fever and unexpected weight change. HENT: Positive for rhinorrhea. Negative for congestion and sore throat. Eyes: Negative for pain and visual disturbance. Respiratory: Negative for cough, chest tightness and shortness of breath. Cardiovascular: Negative for chest pain and palpitations.    Gastrointestinal: Negative for abdominal pain, blood in stool, constipation, diarrhea, nausea and vomiting. Genitourinary: Positive for frequency and urgency. Negative for difficulty urinating, dysuria, hematuria, scrotal swelling and testicular pain. Musculoskeletal: Negative for arthralgias and gait problem. Skin: Negative for rash. Neurological: Negative for dizziness, syncope, weakness, light-headedness and headaches. Hematological: Negative for adenopathy. Does not bruise/bleed easily. Psychiatric/Behavioral: Negative for suicidal ideas. The patient is not nervous/anxious. Current Outpatient Medications:     levothyroxine (SYNTHROID) 100 MCG tablet, Take 1 tablet by mouth Daily, Disp: 90 tablet, Rfl: 1    simvastatin (ZOCOR) 40 MG tablet, Take 1 tablet by mouth nightly, Disp: 90 tablet, Rfl: 1    sertraline (ZOLOFT) 50 MG tablet, Take 1 tablet by mouth daily, Disp: 30 tablet, Rfl: 5    triamcinolone (KENALOG) 0.1 % cream, Apply topically 2 times daily as needed (rash) Indications: PRN Apply topically 2 times daily. , Disp: 80 g, Rfl: 0    Cholecalciferol (VITAMIN D3) 1000 units CAPS, Take by mouth daily Ld 7/2/2020, Disp: , Rfl:     No Known Allergies    Past Medical History:   Diagnosis Date    Arthritis     Enlarged prostate     Hyperlipidemia     Lower esophageal ring (Schatzki) 5/2/2019    Mixed hyperlipidemia 5/2/2019    Thyroid disease        Past Surgical History:   Procedure Laterality Date    CATARACT REMOVAL WITH IMPLANT Bilateral     ESOPHAGUS SURGERY      x 3    TOOTH EXTRACTION      UPPER GASTROINTESTINAL ENDOSCOPY N/A 7/7/2020    EGD WITH ESOPHAGEAL  DILATATION performed by Hayden Nagel MD at [facility] ENDOSCOPY       No family history on file. Social History     Socioeconomic History    Marital status:       Spouse name: Not on file    Number of children: Not on file    Years of education: Not on file    Highest education level: Not on file   Occupational History    Not on file   Social Needs    Financial resource strain: Not on file   [employer] Food insecurity     Worry: Not on file     Inability: Not on file    Transportation needs     Medical: Not on file     Non-medical: Not on file   Tobacco Use    Smoking status: Never Smoker    Smokeless tobacco: Never Used   Substance and Sexual Activity    Alcohol use: No    Drug use: No    Sexual activity: Not on file   Lifestyle    Physical activity     Days per week: Not on file     Minutes per session: Not on file    Stress: Not on file   Relationships    Social connections     Talks on phone: Not on file     Gets together: Not on file     Attends [employer] service: Not on file     Active member of club or organization: Not on file     Attends meetings of clubs or organizations: Not on file     Relationship status: Not on file    Intimate partner violence     Fear of current or ex partner: Not on file     Emotionally abused: Not on file     Physically abused: Not on file     Forced sexual activity: Not on file   Other Topics Concern    Not on file   Social History Narrative    Not on file       Vitals:    11/04/20 1010 11/04/20 1027   BP: (!) 160/78 (!) 160/80   Site: Left Upper Arm    Position: Sitting    Cuff Size: Large Adult    Pulse: 83    Temp: 98 °F (36.7 °C)    SpO2: 98%    Weight: 153 lb 2 oz (69.5 kg)    Height: 5' 10\" (1.778 m)        Exam:  Physical Exam  Constitutional:       Appearance: He is well-developed. HENT:      Head: Normocephalic and atraumatic. Right Ear: External ear normal.      Left Ear: External ear normal.   Eyes:      Pupils: Pupils are equal, round, and reactive to light. Neck:      Musculoskeletal: Normal range of motion and neck supple. Thyroid: No thyromegaly. Cardiovascular:      Rate and Rhythm: Normal rate and regular rhythm. Heart sounds: Normal heart sounds. No murmur. Pulmonary:      Effort: Pulmonary effort is normal.      Breath sounds: Normal breath sounds. No wheezing or rales.    Abdominal:      General: Bowel sounds are normal. Palpations: Abdomen is soft. Tenderness: There is no abdominal tenderness. There is no guarding or rebound. Musculoskeletal: Normal range of motion. Lymphadenopathy:      Cervical: No cervical adenopathy. Skin:     General: Skin is warm and dry. Neurological:      Mental Status: He is alert and oriented to person, place, and time. Cranial Nerves: No cranial nerve deficit. Psychiatric:         Judgment: Judgment normal.         Assessment and Plan:    Gabby Angeles was seen today for hyperlipidemia, thyroid problem, discuss labs and health maintenance.     Diagnoses and all orders for this visit:    Moderate episode of recurrent major depressive disorder ([facility] [address]  - PHQ 9 was positive today at a score of 15, CSSRS reviewed   - no current suicidal plans, reviewed to go to ER if thoughts.   - declines referral to psychology and psychiatry   - add zoloft   - follow up in 4-6 weeks     Mixed hyperlipidemia  - Continue present treatment   - watch diet   - on simvastatin   - follow labs     Acquired hypothyroidism  - Continue present treatment   - on levothyroxine 100mcg   - follow labs (last 5/2020) - borderline underactive      Chronic kidney disease, stage 3 ([facility] [address]  - Continue present treatment   - follow labs   - avoid NSAIDs    Anemia, chronic disease  - Continue present treatment   - follow labs   - monitor for bleeding   - b12 was normal   - iron borderline low   - recommended iron 3x per week - patient self stopped   - follow labs     Lower esophageal ring (Schatzki)  - Continue present treatment   - last EGD (7/2020)   - declined PPI     Vitamin D deficiency  - continue present treatment   - otc supplement   - follow labs     Primary generalized (osteo)arthritis  - Continue present treatment   - avoid NSAIDs   - tylenol as needed  - Disability placard letter given to patient   - stable     Elevated PSA  - uncertain as to reason - BPH or other   - uncertain as to utility of test was elevated in past - normal last year and now elevated again   - PSA again elevated (5/2020)   - declines urology or further evaluation - understands risks   - not taking flomax     Return in about 1 month (around 12/4/2020) for check up and review. No orders of the defined types were placed in this encounter.     Requested Prescriptions     Signed Prescriptions Disp Refills    levothyroxine (SYNTHROID) 100 MCG tablet 90 tablet 1     Sig: Take 1 tablet by mouth Daily    simvastatin (ZOCOR) 40 MG tablet 90 tablet 1     Sig: Take 1 tablet by mouth nightly    sertraline (ZOLOFT) 50 MG tablet 30 tablet 5     Sig: Take 1 tablet by mouth daily     Shiela Ivey MD  11/4/2020  10:53 AM

## 2020-11-04 NOTE — PROGRESS NOTES
Medicare Annual Wellness Visit  Name: Pam Sutton Date: 2020   MRN: [id number] Sex: Male   Age: 80 y.o. Ethnicity: Non-/Non    : 1929 Race: Dian Dela Cruz is here for Medicare AWV    Screenings for behavioral, psychosocial and functional/safety risks, and cognitive dysfunction are all negative except as indicated below. These results, as well as other patient data from the [address] [facility] [address] form, are documented in Flowsheets linked to this Encounter. No Known Allergies      Prior to Visit Medications    Medication Sig Taking? Authorizing Provider   levothyroxine (SYNTHROID) 100 MCG tablet Take 1 tablet by mouth Daily Yes Everett Cesar MD   simvastatin (ZOCOR) 40 MG tablet Take 1 tablet by mouth nightly Yes Everett Cesar MD   sertraline (ZOLOFT) 50 MG tablet Take 1 tablet by mouth daily Yes Everett Cesar MD   triamcinolone (KENALOG) 0.1 % cream Apply topically 2 times daily as needed (rash) Indications: PRN Apply topically 2 times daily. Yes Everett Cesar MD   Cholecalciferol (VITAMIN D3) 1000 units CAPS Take by mouth daily Ld 2020 Yes Historical Provider, MD         Past Medical History:   Diagnosis Date    Arthritis     Enlarged prostate     Hyperlipidemia     Lower esophageal ring (Schatzki) 2019    Mixed hyperlipidemia 2019    Thyroid disease        Past Surgical History:   Procedure Laterality Date    CATARACT REMOVAL WITH IMPLANT Bilateral     ESOPHAGUS SURGERY      x 3    TOOTH EXTRACTION      UPPER GASTROINTESTINAL ENDOSCOPY N/A 2020    EGD WITH ESOPHAGEAL  DILATATION performed by Luis Manuel Gonzalez MD at [facility] ENDOSCOPY       No family history on file.     CareTeam (Including outside providers/suppliers regularly involved in providing care):   Patient Care Team:  Everett Cesar MD as PCP - General (Internal Medicine)  Everett Cesar [facility]  Gabrielle Jean MD as Surgeon this issue  · Inadequate social/emotional support: patient declines any further intervention for this issue  · No Living Will: states has     Health Habits/Nutrition:  Health Habits/Nutrition  Do you exercise for at least 20 minutes 2-3 times per week?: (!) No  Have you lost any weight without trying in the past 3 months?: (!) Yes  Do you eat fewer than 2 meals per day?: No  Have you seen a dentist within the past year?: (!) No  Body mass index: 21.97  Health Habits/Nutrition Interventions:  · Inadequate physical activity:  educational materials provided to promote increased physical activity  · Nutritional issues:  educational materials for healthy, well-balanced diet provided    Hearing/Vision:  No exam data present  Hearing/Vision  Do you or your family notice any trouble with your hearing?: (!) Yes  Do you have difficulty driving, watching TV, or doing any of your daily activities because of your eyesight?: No  Have you had an eye exam within the past year?: (!) No  Hearing/Vision Interventions:  · Hearing concerns:  audiology referral provided  · Vision concerns:  patient encouraged to make appointment with his/her eye specialist    Personalized Preventive Plan   Current Health Maintenance Status  Immunization History   Administered Date(s) Administered    Influenza Virus Vaccine 11/13/2017    Influenza, [place], IM, (6 mo and older Fluzone, Flulaval, Fluarix and 3 yrs and older Afluria) 10/24/2018    Influenza, Quadv, adjuvanted, 65 yrs +, IM, PF (Fluad) 10/14/2020    Influenza, Triv, inactivated, subunit, adjuvanted, IM (Fluad 65 yrs and older) 11/13/2017, 10/25/2019    Pneumococcal Conjugate 13-valent ([id number]) 11/01/2018    Pneumococcal Polysaccharide ([id number]) 11/13/2017        Health Maintenance   Topic Date Due    DTaP/Tdap/Td vaccine (1 - Tdap) 02/04/1948    Shingles Vaccine (1 of 2) 02/04/1979    Annual Wellness Visit (AWV)  05/29/2019    Lipid screen  05/05/2021    TSH testing

## 2020-11-04 NOTE — PATIENT INSTRUCTIONS
Personalized Preventive Plan for Sky Guillory - 11/4/2020  Medicare offers a range of preventive health benefits. Some of the tests and screenings are paid in full while other may be subject to a deductible, co-insurance, and/or copay. Some of these benefits include a comprehensive review of your medical history including lifestyle, illnesses that may run in your family, and various assessments and screenings as appropriate. After reviewing your medical record and screening and assessments performed today your provider may have ordered immunizations, labs, imaging, and/or referrals for you. A list of these orders (if applicable) as well as your Preventive Care list are included within your After Visit Summary for your review. Other Preventive Recommendations:    · A preventive eye exam performed by an eye specialist is recommended every 1-2 years to screen for glaucoma; cataracts, macular degeneration, and other eye disorders. · A preventive dental visit is recommended every 6 months. · Try to get at least 150 minutes of exercise per week or 10,000 steps per day on a pedometer . · Order or download the FREE \"Exercise & Physical Activity: Your Everyday Guide\" from [employer]. Call 6-493.961.3902 or search [employer] online. · You need 0109-6039 mg of calcium and 1793-6039 IU of vitamin D per day. It is possible to meet your calcium requirement with diet alone, but a vitamin D supplement is usually necessary to meet this goal.  · When exposed to the sun, use a sunscreen that protects against both UVA and UVB radiation with an SPF of 30 or greater. Reapply every 2 to 3 hours or after sweating, drying off with a towel, or swimming. · Always wear a seat belt when traveling in a car. Always wear a helmet when riding a bicycle or motorcycle. Heart-Healthy Diet   Sodium, Fat, and Cholesterol Controlled Diet       What Is a Heart Healthy Diet?    A heart-healthy example, this would mean 60 grams of fat or less per day. Saturated fat and trans fat in your diet raises your blood cholesterol the most, much more than dietary cholesterol does. For this reason, on a heart-healthy diet, less than 7% of your calories should come from saturated fat and ideally 0% from trans fat. On an 1800-calorie diet, this translates into less than 14 grams of saturated fat per day, leaving 46 grams of fat to come from mono- and polyunsaturated fats.    Food Choices on a Heart Healthy Diet   Food Category   Foods Recommended   Foods to Avoid   Grains   Breads and rolls without salted tops Most dry and cooked cereals Unsalted crackers and breadsticks Low-sodium or homemade breadcrumbs or stuffing All rice and pastas   Breads, rolls, and crackers with salted tops High-fat baked goods (eg, muffins, donuts, pastries) Quick breads, self-rising flour, and biscuit mixes Regular bread crumbs Instant hot cereals Commercially prepared rice, pasta, or stuffing mixes   Vegetables   Most fresh, frozen, and low-sodium canned vegetables Low-sodium and salt-free vegetable juices Canned vegetables if unsalted or rinsed   Regular canned vegetables and juices, including sauerkraut and pickled vegetables Frozen vegetables with sauces Commercially prepared potato and vegetable mixes   Fruits   Most fresh, frozen, and canned fruits All fruit juices   Fruits processed with salt or sodium   Milk   Nonfat or low-fat (1%) milk Nonfat or low-fat yogurt Cottage cheese, low-fat ricotta, cheeses labeled as low-fat and low-sodium   Whole milk Reduced-fat (2%) milk Malted and chocolate milk Full fat yogurt Most cheeses (unless low-fat and low salt) Buttermilk (no more than 1 cup per week)   Meats and Beans   Lean cuts of fresh or frozen beef, veal, lamb, or pork (look for the word loin) Fresh or frozen poultry without the skin Fresh or frozen fish and some shellfish Egg whites and egg substitutes (Limit whole eggs to three per week) Tofu Nuts or seeds (unsalted, dry-roasted), low-sodium peanut butter Dried peas, beans, and lentils   Any smoked, cured, salted, or canned meat, fish, or poultry (including deluca, chipped beef, cold cuts, hot dogs, sausages, sardines, and anchovies) Poultry skins Breaded and/or fried fish or meats Canned peas, beans, and lentils Salted nuts   Fats and Oils   Olive oil and canola oil Low-sodium, low-fat salad dressings and mayonnaise   Butter, margarine, coconut and palm oils, deluca fat   Snacks, Sweets, and Condiments   Low-sodium or unsalted versions of broths, soups, soy sauce, and condiments Pepper, herbs, and spices; vinegar, lemon, or lime juice Low-fat frozen desserts (yogurt, sherbet, fruit bars) Sugar, cocoa powder, honey, syrup, jam, and preserves Low-fat, trans-fat free cookies, cakes, and pies Shahid and animal crackers, fig bars, brenna snaps   High-fat desserts Broth, soups, gravies, and sauces, made from instant mixes or other high-sodium ingredients Salted snack foods Canned olives Meat tenderizers, seasoning salt, and most flavored vinegars   Beverages   Low-sodium carbonated beverages Tea and coffee in moderation Soy milk   Commercially softened water   Suggestions   Make whole grains, fruits, and vegetables the base of your diet. Choose heart-healthy fats such as canola, olive, and flaxseed oil, and foods high in heart-healthy fats, such as nuts, seeds, soybeans, tofu, and fish. Eat fish at least twice per week; the fish highest in omega-3 fatty acids and lowest in mercury include salmon, herring, mackerel, sardines, and canned chunk light tuna. If you eat fish less than twice per week or have high triglycerides, talk to your doctor about taking fish oil supplements. Read food labels.    For products low in fat and cholesterol, look for fat free, low-fat, cholesterol free, saturated fat free, and trans fat freeAlso scan the Nutrition Facts Label, which lists saturated fat, trans fat, and cholesterol amounts. For products low in sodium, look for sodium free, very low sodium, low sodium, no added salt, and unsalted   Skip the salt when cooking or at the table; if food needs more flavor, get creative and try out different herbs and spices. Garlic and onion also add substantial flavor to foods. Trim any visible fat off meat and poultry before cooking, and drain the fat off after sandoval. Use cooking methods that require little or no added fat, such as grilling, boiling, baking, poaching, broiling, roasting, steaming, stir-frying, and sauting. Avoid fast food and convenience food. They tend to be high in saturated and trans fat and have a lot of added salt. Talk to a registered dietitian for individualized diet advice. Last Reviewed: March 2011 Bang Baires MS, MPH, RD   Updated: 3/29/2011   ·     Keep Your Memory Josue Rnee       Many factors can affect your ability to remembera hectic lifestyle, aging, stress, chronic disease, and certain medicines. But, there are steps you can take to sharpen your mind and help preserve your memory. Challenge Your Brain   Regularly challenging your mind may help keeps it in top shape. Good mental exercises include:   Crossword puzzlesUse a dictionary if you need it; you will learn more that way. Brainteasers Try some! Crafts, such as wood working and sewing   Hobbies, such as gardening and building model airplanes   SocializingVisit old friends or join groups to meet new ones. Reading   Learning a new language   Taking a class, whether it be art history or genna chi   TravelingExperience the food, history, and culture of your destination   Learning to use a computer   Going to museums, the theater, or thought-provoking movies   Changing things in your daily life, such as reversing your pattern in the grocery store or brushing your teeth using your nondominant hand   Use Memory Aids   There is no need to remember every detail on your own.  These memory aids can help:   Calendars and day planners   Electronic organizers to store all sorts of helpful informationThese devices can \"beep\" to remind you of appointments. A book of days to record birthdays, anniversaries, and other occasions that occur on the same date every year   Detailed \"to-do\" lists and strategically placed sticky notes   Quick \"study\" sessionsBefore a gathering, review who will be there so their names will be fresh in your mind. Establish routinesFor example, keep your keys, wallet, and umbrella in the same place all the time or take medicine with your 8:00 AM glass of juice   Live a Healthy Life   Many actions that will keep your body strong will do the same for your mind. For example:   Talk to Your Doctor About Herbs and Supplements    Malnutrition and vitamin deficiencies can impair your mental function. For example, vitamin B12 deficiency can cause a range of symptoms, including confusion. But, what if your nutritional needs are being met? Can herbs and supplements still offer a benefit? Researchers have investigated a range of natural remedies, such as ginkgo , ginseng , and the supplement phosphatidylserine (PS). So far, though, the evidence is inconsistent as to whether these products can improve memory or thinking. If you are interested in taking herbs and supplements, talk to your doctor first because they may interact with other medicines that you are taking. Exercise Regularly    Among the many benefits of regular exercise are increased blood flow to the brain and decreased risk of certain diseases that can interfere with memory function. One study found that even moderate exercise has a beneficial effect. Examples of \"moderate\" exercise include:   Playing 18 holes of golf once a week, without a cart   Playing tennis twice a week   Walking one mile per day   Manage Stress    It can be tough to remember what is important when your mind is cluttered.  Make time for relaxation. Choose activities that calm you down, and make it routine. Manage Chronic Conditions    Side effects of high blood pressure , diabetes, and heart disease can interfere with mental function. Many of the lifestyle steps discussed here can help manage these conditions. Strive to eat a healthy diet, exercise regularly, get stress under control, and follow your doctor's advice for your condition. Minimize Medications    Talk to your doctor about the medicines that you take. Some may be unnecessary. Also, healthy lifestyle habits may lower the need for certain drugs.      Last Reviewed: April 2010 Tom Guerrero MD   Updated: 4/13/2010   ·

## 2020-11-05 ENCOUNTER — TELEPHONE (OUTPATIENT)
Dept: FAMILY MEDICINE CLINIC | Age: 85
End: 2020-11-05

## 2020-11-05 NOTE — LETTER
[address]  Phone: [phone]  Fax: [phone]    Fariha Molina MD        November 10, 2020     [address]  [address]  [facility] [place] [address]      Dear Tao Farley:    Below are the results from your recent visit:    Resulted Orders   PSA, DIAGNOSTIC   Result Value Ref Range    PSA 12.76 (H) 0.00 - 4.00 ng/mL   Urinalysis   Result Value Ref Range    Color, UA Yellow Straw/Yellow    Clarity, UA Clear Clear    Glucose, Ur Negative Negative mg/dL    Bilirubin Urine Negative Negative    Ketones, Urine Negative Negative mg/dL    Specific Gravity, UA 1.020 1.005 - 1.030    Blood, Urine SMALL (A) Negative    pH, UA 6.0 5.0 - 9.0    Protein, UA Negative Negative mg/dL    Urobilinogen, Urine 0.2 <2.0 E.U./dL    Nitrite, Urine Negative Negative    Leukocyte Esterase, Urine TRACE (A) Negative   Vitamin D 25 Hydroxy   Result Value Ref Range    Vit D, 25-Hydroxy 25 (L) 30 - 100 ng/mL      Comment:      <20 ng/mL. ........... Carlitos Dirk Deficient  20-30 ng/mL. ......... Carlitos Dirk Insufficient   ng/mL. ........ Carlitos Dirk Sufficient  >100 ng/mL. .......... Carlitos Dirk Toxic     TSH without Reflex   Result Value Ref Range    TSH 0.345 0.270 - 4.200 uIU/mL   Vitamin B12 & Folate   Result Value Ref Range    Vitamin B-12 945 211 - 946 pg/mL    Folate >20.0 4.8 - 24.2 ng/mL   Iron and TIBC   Result Value Ref Range    Iron 69 59 - 158 mcg/dL    TIBC 334 250 - 450 mcg/dL    Iron Saturation 21 20 - 55 %   Magnesium   Result Value Ref Range    Magnesium 2.3 1.6 - 2.6 mg/dL   Lipid, Fasting   Result Value Ref Range    Cholesterol, Fasting 120 0 - 199 mg/dL    Triglyceride, Fasting 60 0 - 149 mg/dL    HDL 50 >40 mg/dL    LDL Calculated 58 0 - 99 mg/dL    VLDL Cholesterol Calculated 12 mg/dL   Comprehensive Metabolic Panel   Result Value Ref Range    Sodium 144 132 - 146 mmol/L    Potassium 4.9 3.5 - 5.0 mmol/L    Chloride 108 (H) 98 - 107 mmol/L    CO2 23 22 - 29 mmol/L Anion Gap 13 7 - 16 mmol/L    Glucose 75 74 - 99 mg/dL    BUN 22 8 - 23 mg/dL    CREATININE 1.4 (H) 0.7 - 1.2 mg/dL    GFR Non-African American 47 >=60 mL/min/1.73      Comment:      Chronic Kidney Disease: less than 60 ml/min/1.73 sq.m. Kidney Failure: less than 15 ml/min/1.73 sq.m. Results valid for patients 18 years and older. GFR  57     Calcium 9.8 8.6 - 10.2 mg/dL    Total Protein 7.5 6.4 - 8.3 g/dL    Alb 4.1 3.5 - 5.2 g/dL    Total Bilirubin 0.3 0.0 - 1.2 mg/dL    Alkaline Phosphatase 56 40 - 129 U/L    ALT 7 0 - 40 U/L    AST 19 0 - 39 U/L   CBC Auto Differential   Result Value Ref Range    WBC 5.2 4.5 - 11.5 E9/L    RBC 4.24 3.80 - 5.80 E12/L    Hemoglobin 11.2 (L) 12.5 - 16.5 g/dL    Hematocrit 38.8 37.0 - 54.0 %    MCV 91.5 80.0 - 99.9 fL    MCH 26.4 26.0 - 35.0 pg    MCHC 28.9 (L) 32.0 - 34.5 %    RDW 18.0 (H) 11.5 - 15.0 fL    Platelets 645 433 - 688 E9/L    MPV 11.2 7.0 - 12.0 fL    Neutrophils % 33.0 (L) 43.0 - 80.0 %    Lymphocytes % 33.0 20.0 - 42.0 %    Monocytes % 33.0 (H) 2.0 - 12.0 %    Eosinophils % 0.2 0.0 - 6.0 %    Basophils % 0.9 0.0 - 2.0 %    Neutrophils Absolute 1.72 (L) 1.80 - 7.30 E9/L    Lymphocytes Absolute 1.72 1.50 - 4.00 E9/L    Monocytes Absolute 1.72 (H) 0.10 - 0.95 E9/L    Eosinophils Absolute 0.00 (L) 0.05 - 0.50 E9/L    Basophils Absolute 0.05 0.00 - 0.20 E9/L    Anisocytosis 1+     Polychromasia 2+     Poikilocytes 1+     [place] Cells 1+     Ovalocytes 1+    Microscopic Urinalysis   Result Value Ref Range    WBC, UA 1-3 0 - 5 /HPF    RBC, UA 0-1 0 - 2 /HPF    Bacteria, UA NONE SEEN None Seen /HPF       The test results show:    PSA for prostate was much more elevated when compared to previous. Would highly recommend referral to urology.  Please call office to discuss.     Urine analysis showed small microscopic blood and white cells     Blood counts were slightly low, considered anemia, level slightly lower when compared to previous    Iron levels were still borderline low but improved when compared to previous     Vitamin W50 and folic acid levels were normal     Labs showed kidney dysfunction, level appears to be stable when compared to previous     Thyroid levels were normal, would recommend to continue present dose of medications     Cholesterol levels were fair, would recommend continue present medications and continue to watch diet     Electrolytes and liver function tests were normal    If you have any questions or concerns, please don't hesitate to call.     Sincerely,        Jerrod Diaz MD

## 2020-11-05 NOTE — TELEPHONE ENCOUNTER
Please let the patient know that blood work results showed    PSA for prostate was much more elevated when compared to previous.   Would highly recommend referral to urology    Urine analysis showed small microscopic blood and white cells    Blood counts were slightly low, considered anemia, level slightly lower when compared to previous    Iron levels were still borderline low but improved when compared to previous    Vitamin L71 and folic acid levels were normal    Labs showed kidney dysfunction, level appears to be stable when compared to previous    Thyroid levels were normal, would recommend to continue present dose of medications    Cholesterol levels were fair, would recommend continue present medications and continue to watch diet    Electrolytes and liver function tests were normal    Please send a copy of the blood work results to the patient    Thanks

## 2020-11-10 NOTE — TELEPHONE ENCOUNTER
I tried calling pt today but his line is busy. Result letter mailed.  I asked that pt call the office to discuss possible referral.

## 2020-12-11 ENCOUNTER — OFFICE VISIT (OUTPATIENT)
Dept: PRIMARY CARE CLINIC | Age: 85
End: 2020-12-11
Payer: MEDICARE

## 2020-12-11 VITALS
SYSTOLIC BLOOD PRESSURE: 118 MMHG | TEMPERATURE: 98.4 F | RESPIRATION RATE: 16 BRPM | HEART RATE: 69 BPM | BODY MASS INDEX: 21.59 KG/M2 | WEIGHT: 150.8 LBS | HEIGHT: 70 IN | OXYGEN SATURATION: 97 % | DIASTOLIC BLOOD PRESSURE: 64 MMHG

## 2020-12-11 PROCEDURE — G8484 FLU IMMUNIZE NO ADMIN: HCPCS | Performed by: INTERNAL MEDICINE

## 2020-12-11 PROCEDURE — 99213 OFFICE O/P EST LOW 20 MIN: CPT | Performed by: INTERNAL MEDICINE

## 2020-12-11 PROCEDURE — 1123F ACP DISCUSS/DSCN MKR DOCD: CPT | Performed by: INTERNAL MEDICINE

## 2020-12-11 PROCEDURE — G8427 DOCREV CUR MEDS BY ELIG CLIN: HCPCS | Performed by: INTERNAL MEDICINE

## 2020-12-11 PROCEDURE — G8420 CALC BMI NORM PARAMETERS: HCPCS | Performed by: INTERNAL MEDICINE

## 2020-12-11 PROCEDURE — 1036F TOBACCO NON-USER: CPT | Performed by: INTERNAL MEDICINE

## 2020-12-11 PROCEDURE — 4040F PNEUMOC VAC/ADMIN/RCVD: CPT | Performed by: INTERNAL MEDICINE

## 2020-12-11 ASSESSMENT — ENCOUNTER SYMPTOMS
CHEST TIGHTNESS: 0
BLOOD IN STOOL: 0
SHORTNESS OF BREATH: 0
COUGH: 0
ABDOMINAL PAIN: 0
NAUSEA: 0
VOMITING: 0
CONSTIPATION: 0
RHINORRHEA: 1
SORE THROAT: 0
DIARRHEA: 0
EYE PAIN: 0

## 2020-12-11 NOTE — PROGRESS NOTES
[facility]   Internal Medicine     2020  Veronika Elizalde : 1929 Sex: male  Age:89 y.o. Chief Complaint   Patient presents with    Depression        HPI:   Patient presents to office for review and management of chronic medical conditions. - Depression and anxiety is improved. screening was positive on office visit (2020). States symptoms of lonely and having no one to talk to causes depression. No current suicidal plans. Added zoloft at last visit. States seems to be helping, No reported side effects. States waking up less to urinate since starting medications. States has esophageal stricture and ring. Follows with GI. States has had esophageal dilation. Not taking omeprazole. Had EGD () 1. Schatzki's ring s/p dilation, will need periodic dilation. Symptoms improved. States has high cholesterol. Watching diet. On Simvastatin. No reported side effects. States has hypothyroidism. On levothyroxine. States has arthritis. Has pain to shoulder an ankle. Symptoms are intermittent. Stable. States has chronic kidney disease. Not currently following with nephrology. Discussed medications to avoid. Labs stable. States has anemia. Has had colonoscopy. Following labs - last labs (2020) improved. States stopped taking iron      States has enlarged prostate. PSA was elevated at 7.69. States having urinary frequency. Declines urology. States has vitamin D def. On otc supplement     EGD () 1. Schatzki's ring s/p dilation, will need periodic dilation   2. Essentially no hiatal hernia. 3.  Nonspecific antral and duodenal erythema without erosions or  ulcerations. ROS:  Review of Systems   Constitutional: Negative for appetite change, chills, fever and unexpected weight change. HENT: Positive for rhinorrhea. Negative for congestion and sore throat. Eyes: Negative for pain and visual disturbance.    Respiratory: Negative for cough, chest tightness and shortness of breath. Cardiovascular: Negative for chest pain and palpitations. Gastrointestinal: Negative for abdominal pain, blood in stool, constipation, diarrhea, nausea and vomiting. Genitourinary: Positive for frequency and urgency. Negative for difficulty urinating, dysuria, hematuria, scrotal swelling and testicular pain. Musculoskeletal: Negative for arthralgias and gait problem. Skin: Negative for rash. Neurological: Negative for dizziness, syncope, weakness, light-headedness and headaches. Hematological: Negative for adenopathy. Does not bruise/bleed easily. Psychiatric/Behavioral: Negative for suicidal ideas. The patient is not nervous/anxious. Current Outpatient Medications:     levothyroxine (SYNTHROID) 100 MCG tablet, Take 1 tablet by mouth Daily, Disp: 90 tablet, Rfl: 1    simvastatin (ZOCOR) 40 MG tablet, Take 1 tablet by mouth nightly, Disp: 90 tablet, Rfl: 1    sertraline (ZOLOFT) 50 MG tablet, Take 1 tablet by mouth daily, Disp: 30 tablet, Rfl: 5    triamcinolone (KENALOG) 0.1 % cream, Apply topically 2 times daily as needed (rash) Indications: PRN Apply topically 2 times daily. , Disp: 80 g, Rfl: 0    Cholecalciferol (VITAMIN D3) 1000 units CAPS, Take by mouth daily Ld 7/2/2020, Disp: , Rfl:     No Known Allergies    Past Medical History:   Diagnosis Date    Arthritis     Enlarged prostate     Hyperlipidemia     Lower esophageal ring (Schatzki) 5/2/2019    Mixed hyperlipidemia 5/2/2019    Thyroid disease        Past Surgical History:   Procedure Laterality Date    CATARACT REMOVAL WITH IMPLANT Bilateral     ESOPHAGUS SURGERY      x 3    TOOTH EXTRACTION      UPPER GASTROINTESTINAL ENDOSCOPY N/A 7/7/2020    EGD WITH ESOPHAGEAL  DILATATION performed by Gladys Pérez MD at [address]       History reviewed. No pertinent family history. Social History     Socioeconomic History    Marital status:       Spouse name: Not on file    Number of children: Not on file    Years of education: Not on file    Highest education level: Not on file   Occupational History    Not on file   Social Needs    Financial resource strain: Not on file    Food insecurity     Worry: Not on file     Inability: Not on file    Transportation needs     Medical: Not on file     Non-medical: Not on file   Tobacco Use    Smoking status: Never Smoker    Smokeless tobacco: Never Used   Substance and Sexual Activity    Alcohol use: No    Drug use: No    Sexual activity: Not on file   Lifestyle    Physical activity     Days per week: Not on file     Minutes per session: Not on file    Stress: Not on file   Relationships    Social connections     Talks on phone: Not on file     Gets together: Not on file     Attends [employer] service: Not on file     Active member of club or organization: Not on file     Attends meetings of clubs or organizations: Not on file     Relationship status: Not on file    Intimate partner violence     Fear of current or ex partner: Not on file     Emotionally abused: Not on file     Physically abused: Not on file     Forced sexual activity: Not on file   Other Topics Concern    Not on file   Social History Narrative    Not on file       Vitals:    12/11/20 1505   BP: 118/64   Pulse: 69   Resp: 16   Temp: 98.4 °F (36.9 °C)   SpO2: 97%   Weight: 150 lb 12.8 oz (68.4 kg)   Height: 5' 10\" (1.778 m)       Exam:  Physical Exam  Constitutional:       Appearance: He is well-developed. HENT:      Head: Normocephalic and atraumatic. Right Ear: External ear normal.      Left Ear: External ear normal.   Eyes:      Pupils: Pupils are equal, round, and reactive to light. Neck:      Musculoskeletal: Normal range of motion and neck supple. Thyroid: No thyromegaly. Cardiovascular:      Rate and Rhythm: Normal rate and regular rhythm. Heart sounds: Normal heart sounds. No murmur.    Pulmonary:      Effort: Pulmonary effort is normal. Breath sounds: Normal breath sounds. No wheezing or rales. Abdominal:      General: Bowel sounds are normal.      Palpations: Abdomen is soft. Tenderness: There is no abdominal tenderness. There is no guarding or rebound. Musculoskeletal: Normal range of motion. Lymphadenopathy:      Cervical: No cervical adenopathy. Skin:     General: Skin is warm and dry. Neurological:      Mental Status: He is alert and oriented to person, place, and time. Cranial Nerves: No cranial nerve deficit. Psychiatric:         Judgment: Judgment normal.         Assessment and Plan:    Adan Cisneros was seen today for hyperlipidemia, thyroid problem, discuss labs and health maintenance.     Diagnoses and all orders for this visit:    Moderate episode of recurrent major depressive disorder ([facility] [address]  - PHQ 9 was positive today at a score of 15, CSSRS reviewed   - no current suicidal plans, reviewed to go to ER if thoughts.   - declines referral to psychology and psychiatry   - now on zoloft - helping   - improved     Mixed hyperlipidemia  - Continue present treatment   - watch diet   - on simvastatin   - follow labs     Acquired hypothyroidism  - Continue present treatment   - on levothyroxine 100mcg   - follow labs (last 5/2020) - borderline underactive      Chronic kidney disease, stage 3 ([facility] [address]  - Continue present treatment   - follow labs   - avoid NSAIDs    Anemia, chronic disease  - Continue present treatment   - follow labs   - monitor for bleeding   - b12 was normal   - iron borderline low   - recommended iron 3x per week - patient self stopped   - follow labs     Lower esophageal ring (Schatzki)  - Continue present treatment   - last EGD (7/2020)   - declined PPI     Vitamin D deficiency  - continue present treatment   - otc supplement   - follow labs     Primary generalized (osteo)arthritis  - Continue present treatment   - avoid NSAIDs   - tylenol as needed  - Disability placard letter given to patient   - stable Elevated PSA  - uncertain as to reason - BPH or other   - uncertain as to utility of test was elevated in past - normal last year and now elevated again   - PSA again elevated (5/2020)   - declines urology or further evaluation - understands risks   - not taking flomax     Return for check up and review, as scheduled.     Orders Placed This Encounter   Procedures    Comprehensive Metabolic Panel     Standing Status:   Future     Standing Expiration Date:   12/11/2021    Magnesium     Standing Status:   Future     Standing Expiration Date:   12/11/2021    Lipid, Fasting     Standing Status:   Future     Standing Expiration Date:   12/11/2021    Vitamin D 25 Hydroxy     Standing Status:   Future     Standing Expiration Date:   12/11/2021    TSH without Reflex     Standing Status:   Future     Standing Expiration Date:   3/11/2021    CBC Auto Differential     Standing Status:   Future     Standing Expiration Date:   12/11/2021    PSA, DIAGNOSTIC     Standing Status:   Future     Standing Expiration Date:   12/11/2021   [facility]     Referral Priority:   Routine     Referral Type:   Eval and Treat     Referral Reason:   Specialty Services Required     Requested Specialty:   Audiology     Number of Visits Requested:   1     Requested Prescriptions      No prescriptions requested or ordered in this encounter     Shiela Ivey MD  12/11/2020  3:51 PM

## 2021-04-06 ENCOUNTER — IMMUNIZATION (OUTPATIENT)
Dept: PRIMARY CARE CLINIC | Age: 86
End: 2021-04-06
Payer: MEDICARE

## 2021-04-06 PROCEDURE — 91300 COVID-19, PFIZER VACCINE 30MCG/0.3ML DOSE: CPT | Performed by: INTERNAL MEDICINE

## 2021-04-06 PROCEDURE — 0001A COVID-19, PFIZER VACCINE 30MCG/0.3ML DOSE: CPT | Performed by: INTERNAL MEDICINE

## 2021-04-27 ENCOUNTER — PREP FOR PROCEDURE (OUTPATIENT)
Dept: GASTROENTEROLOGY | Age: 86
End: 2021-04-27

## 2021-04-27 DIAGNOSIS — Z90.79 S/P TURP: Primary | ICD-10-CM

## 2021-04-27 RX ORDER — SODIUM CHLORIDE 0.9 % (FLUSH) 0.9 %
5-40 SYRINGE (ML) INJECTION EVERY 12 HOURS SCHEDULED
Status: CANCELLED | OUTPATIENT
Start: 2021-04-27

## 2021-04-27 RX ORDER — SODIUM CHLORIDE 9 MG/ML
INJECTION, SOLUTION INTRAVENOUS CONTINUOUS
Status: CANCELLED | OUTPATIENT
Start: 2021-04-27

## 2021-04-27 RX ORDER — SODIUM CHLORIDE 0.9 % (FLUSH) 0.9 %
5-40 SYRINGE (ML) INJECTION PRN
Status: CANCELLED | OUTPATIENT
Start: 2021-04-27

## 2021-04-27 RX ORDER — SODIUM CHLORIDE 9 MG/ML
25 INJECTION, SOLUTION INTRAVENOUS PRN
Status: CANCELLED | OUTPATIENT
Start: 2021-04-27

## 2021-04-27 NOTE — H&P (VIEW-ONLY)
Hugh Wagoner      : 1929    Referring Physician:  Haroldo Garnett M.D. Problem:  Dysphagia. Subjective  Mr. Vic Nunez has been seen in this office previously. He had been seen for the first time in this office at the age of 80 back in 2018. He had undergone endoscopic evaluations elsewhere by 2 separate physicians with improvement of a year or slightly longer. When I evaluated him endoscopically, there was a ring. It was dilated over a guidewire to [place]. In the interim, he apparently had recurrent symptoms and was seen through the office of Dr. Velasquez Brice, who, in February of last year, repeated an endoscopic evaluation. He returns to this office with 2 episodes of dysphagia for solids lasting for up to a ½ hour. He is otherwise well. PMH/Surgical Hx:  Hyperlipidemia, hypothyroidism and BPH. He has had EGD and colonoscopies. SH:   He is . He does not smoke or use alcohol. Current Medications:  Thyroid replacement, simvastatin, vitamin D and tamsulosin. Allergies:  NKDA. Objective  Weight 157 pounds, which is up from 148 pounds when last seen here. Blood pressure 150/88. His pulse is regular. Temperature is 99 degrees. Examination of the head, ears, eyes, nose and throat reveals the absence of pallor or scleral icterus. No neck vein elevation or adenopathy. Lungs clear. Heart tones normal.  Regular rate and regular rhythm. No audible murmur or gallop. Soft abdomen. Nontender. No hepatic or splenic enlargement, ascites or dependent edema. A digital rectal exam was not done. Assessment  Symptomatic ring. Despite adequate dilatation, symptoms rapidly recur, but there is no doubt this is a benign situation.

## 2021-04-28 ENCOUNTER — IMMUNIZATION (OUTPATIENT)
Dept: PRIMARY CARE CLINIC | Age: 86
End: 2021-04-28
Payer: MEDICARE

## 2021-04-28 PROCEDURE — 0002A COVID-19, PFIZER VACCINE 30MCG/0.3ML DOSE: CPT | Performed by: INTERNAL MEDICINE

## 2021-04-28 PROCEDURE — 91300 COVID-19, PFIZER VACCINE 30MCG/0.3ML DOSE: CPT | Performed by: INTERNAL MEDICINE

## 2021-04-29 ENCOUNTER — HOSPITAL ENCOUNTER (OUTPATIENT)
Age: 86
Discharge: HOME | End: 2021-05-01
Payer: MEDICARE

## 2021-04-29 DIAGNOSIS — Z01.818 PREOP TESTING: ICD-10-CM

## 2021-04-29 PROCEDURE — U0003 INFECTIOUS AGENT DETECTION BY NUCLEIC ACID (DNA OR RNA); SEVERE ACUTE RESPIRATORY SYNDROME CORONAVIRUS 2 (SARS-COV-2) (CORONAVIRUS DISEASE [COVID-19]), AMPLIFIED PROBE TECHNIQUE, MAKING USE OF HIGH THROUGHPUT TECHNOLOGIES AS DESCRIBED BY CMS-2020-01-R: HCPCS

## 2021-04-29 RX ORDER — M-VIT,TX,IRON,MINS/CALC/FOLIC 27MG-0.4MG
1 TABLET ORAL DAILY
COMMUNITY
End: 2022-05-20 | Stop reason: ALTCHOICE

## 2021-04-29 NOTE — PROGRESS NOTES
[facility] PRE-ADMISSION TESTING INSTRUCTIONS    The Preadmission Testing patient is instructed accordingly using the following criteria (check applicable):    ARRIVAL INSTRUCTIONS:  [x] Parking the day of Surgery is located in the Main Entrance lot. Upon entering the door, make an immediate right to the surgery reception desk    [x] Bring photo ID and insurance card    [] Bring in a copy of Living will or Durable Power of  papers. [x] Please be sure to arrange for responsible adult to provide transportation to and from the hospital    [x] Please arrange for responsible adult to be with you for the 24 hour period post procedure due to having anesthesia      GENERAL INSTRUCTIONS:    [x] Nothing by mouth after midnight, including gum, candy, mints or water    [x] You may brush your teeth, but do not swallow any water    [] Take medications as instructed with 1-2 oz of water    [x] Stop herbal supplements and vitamins 5 days prior to procedure    [] Follow preop dosing of blood thinners per physician instructions    [] Take 1/2 dose of evening insulin, but no insulin after midnight    [] No oral diabetic medications after midnight    [] If diabetic and have low blood sugar or feel symptomatic, take 1-2oz apple juice only    [] Bring inhalers day of surgery    [] Bring C-PAP/ Bi-Pap day of surgery    [] Bring urine specimen day of surgery    [x] Shower or bath with soap, lather and rinse well, AM of Surgery, no lotion, powders or creams to surgical site    [] Follow bowel prep as instructed per surgeon    [x] No tobacco products within 24 hours of surgery     [x] No alcohol or illegal drug use within 24 hours of surgery.     [x] Jewelry, body piercing's, eyeglasses, contact lenses and dentures are not permitted into surgery (bring cases)      [] Please do not wear any nail polish, make up or hair products on the day of surgery    [x] You can expect a call the business day prior to

## 2021-04-29 NOTE — PROGRESS NOTES
Have you been tested for COVID  Yes           Have you been told you were positive for COVID No  Have you had any known exposure to someone that is positive for COVID No  Do you have a cough                   No              Do you have shortness of breath No                 Do you have a sore throat            No                Are you having chills                    No                Are you having muscle aches. No                    Please come to the hospital wearing a mask and have your significant other wear a mask as well. Both of you should check your temperature before leaving to come here,  if it is 100 or higher please call [phone] for instruction.

## 2021-04-30 ENCOUNTER — OFFICE VISIT (OUTPATIENT)
Dept: PRIMARY CARE CLINIC | Age: 86
End: 2021-04-30
Payer: MEDICARE

## 2021-04-30 VITALS
TEMPERATURE: 97.6 F | HEART RATE: 78 BPM | DIASTOLIC BLOOD PRESSURE: 62 MMHG | SYSTOLIC BLOOD PRESSURE: 118 MMHG | BODY MASS INDEX: 20.04 KG/M2 | OXYGEN SATURATION: 98 % | RESPIRATION RATE: 16 BRPM | WEIGHT: 140 LBS | HEIGHT: 70 IN

## 2021-04-30 DIAGNOSIS — E03.9 ACQUIRED HYPOTHYROIDISM: ICD-10-CM

## 2021-04-30 DIAGNOSIS — R53.83 OTHER FATIGUE: ICD-10-CM

## 2021-04-30 DIAGNOSIS — R97.20 ELEVATED PSA: ICD-10-CM

## 2021-04-30 DIAGNOSIS — D63.8 ANEMIA, CHRONIC DISEASE: ICD-10-CM

## 2021-04-30 DIAGNOSIS — E55.9 VITAMIN D DEFICIENCY: ICD-10-CM

## 2021-04-30 DIAGNOSIS — E78.2 MIXED HYPERLIPIDEMIA: ICD-10-CM

## 2021-04-30 DIAGNOSIS — F33.1 MODERATE EPISODE OF RECURRENT MAJOR DEPRESSIVE DISORDER (HCC): ICD-10-CM

## 2021-04-30 DIAGNOSIS — K22.2 LOWER ESOPHAGEAL RING (SCHATZKI): ICD-10-CM

## 2021-04-30 DIAGNOSIS — H91.93 BILATERAL HEARING LOSS, UNSPECIFIED HEARING LOSS TYPE: ICD-10-CM

## 2021-04-30 DIAGNOSIS — R23.8 OTHER SKIN CHANGES: ICD-10-CM

## 2021-04-30 DIAGNOSIS — M15.0 PRIMARY GENERALIZED (OSTEO)ARTHRITIS: ICD-10-CM

## 2021-04-30 DIAGNOSIS — N18.30 STAGE 3 CHRONIC KIDNEY DISEASE, UNSPECIFIED WHETHER STAGE 3A OR 3B CKD (HCC): Primary | ICD-10-CM

## 2021-04-30 PROCEDURE — 1036F TOBACCO NON-USER: CPT | Performed by: INTERNAL MEDICINE

## 2021-04-30 PROCEDURE — 4040F PNEUMOC VAC/ADMIN/RCVD: CPT | Performed by: INTERNAL MEDICINE

## 2021-04-30 PROCEDURE — 1123F ACP DISCUSS/DSCN MKR DOCD: CPT | Performed by: INTERNAL MEDICINE

## 2021-04-30 PROCEDURE — G8420 CALC BMI NORM PARAMETERS: HCPCS | Performed by: INTERNAL MEDICINE

## 2021-04-30 PROCEDURE — 99214 OFFICE O/P EST MOD 30 MIN: CPT | Performed by: INTERNAL MEDICINE

## 2021-04-30 PROCEDURE — G8427 DOCREV CUR MEDS BY ELIG CLIN: HCPCS | Performed by: INTERNAL MEDICINE

## 2021-04-30 RX ORDER — LEVOTHYROXINE SODIUM 0.1 MG/1
100 TABLET ORAL DAILY
Qty: 90 TABLET | Refills: 1 | Status: SHIPPED
Start: 2021-04-30 | End: 2021-11-05 | Stop reason: SDUPTHER

## 2021-04-30 RX ORDER — MIRTAZAPINE 7.5 MG/1
7.5 TABLET, FILM COATED ORAL NIGHTLY
Qty: 30 TABLET | Refills: 5 | Status: SHIPPED
Start: 2021-04-30 | End: 2021-08-16 | Stop reason: ALTCHOICE

## 2021-04-30 RX ORDER — TRIAMCINOLONE ACETONIDE 1 MG/G
CREAM TOPICAL
Qty: 1 TUBE | Refills: 1 | Status: SHIPPED
Start: 2021-04-30 | End: 2021-11-05 | Stop reason: SDUPTHER

## 2021-04-30 RX ORDER — TAMSULOSIN HYDROCHLORIDE 0.4 MG/1
0.4 CAPSULE ORAL DAILY
Qty: 30 CAPSULE | Refills: 5 | Status: ON HOLD
Start: 2021-04-30 | End: 2021-06-08 | Stop reason: HOSPADM

## 2021-04-30 RX ORDER — SIMVASTATIN 40 MG
40 TABLET ORAL NIGHTLY
Qty: 90 TABLET | Refills: 1 | Status: SHIPPED
Start: 2021-04-30 | End: 2022-01-11 | Stop reason: SDUPTHER

## 2021-04-30 ASSESSMENT — ENCOUNTER SYMPTOMS
CONSTIPATION: 0
EYE PAIN: 0
RHINORRHEA: 1
DIARRHEA: 0
ABDOMINAL PAIN: 0
NAUSEA: 0
SHORTNESS OF BREATH: 0
BLOOD IN STOOL: 0
CHEST TIGHTNESS: 0
COUGH: 0
SORE THROAT: 0
VOMITING: 0

## 2021-04-30 NOTE — PROGRESS NOTES
[facility]) Physicians   Internal Medicine     2021  Mele Salinas : 1929 Sex: male  Age:89 y.o. Chief Complaint   Patient presents with    Depression        HPI:   Patient presents to office for review and management of chronic medical conditions. - Depression and anxiety is unchanged. screening was positive on office visit (2020). States symptoms of lonely and having no one to talk to causes depression. No current suicidal plans. Added zoloft at last visit. States seems to be helping, No reported side effects. States waking up less to urinate since starting medications. States has esophageal stricture and ring. Follows with GI. States has had esophageal dilation. Not taking omeprazole. Had EGD () 1. Schatzki's ring s/p dilation, will need periodic dilation. Symptoms improved. Last visit with GI per reviewed note  () - dysphagia, poss symptomatic ring poss EGD. States has high cholesterol. Watching diet. On Simvastatin. No reported side effects. States has hypothyroidism. On levothyroxine. States has arthritis. Has pain to shoulder an ankle. Symptoms are intermittent. Stable. States has chronic kidney disease. Not currently following with nephrology. Discussed medications to avoid. Labs stable. States has anemia. Has had colonoscopy. Following labs - last labs (2020) improved. States stopped taking iron      States has enlarged prostate. PSA was elevated at 7.69. States having urinary frequency. Has Declined urology. States has vitamin D def. On otc supplement     Health Maintenance   - immunizations:   Influenza Vaccination - ()   Pneumonia Vaccination  Zoster/Shingles Vaccine  Tetanus Vaccination  covid (2021) #1, (2021) #2 - pfizer     - Screenings:   Colonoscopy   Prostate     EGD () 1. Schatzki's ring s/p dilation, will need periodic dilation   2. Essentially no hiatal hernia.   3.  Nonspecific antral and duodenal erythema without erosions or Ulcerations. ROS:  Review of Systems   Constitutional: Negative for appetite change, chills, fever and unexpected weight change. HENT: Positive for rhinorrhea. Negative for congestion and sore throat. Eyes: Negative for pain and visual disturbance. Respiratory: Negative for cough, chest tightness and shortness of breath. Cardiovascular: Negative for chest pain and palpitations. Gastrointestinal: Negative for abdominal pain, blood in stool, constipation, diarrhea, nausea and vomiting. Genitourinary: Positive for frequency and urgency. Negative for difficulty urinating, dysuria, hematuria, scrotal swelling and testicular pain. Musculoskeletal: Negative for arthralgias and gait problem. Skin: Negative for rash. Neurological: Negative for dizziness, syncope, weakness, light-headedness and headaches. Hematological: Negative for adenopathy. Does not bruise/bleed easily. Psychiatric/Behavioral: Negative for suicidal ideas. The patient is not nervous/anxious. Current Outpatient Medications:     triamcinolone (KENALOG) 0.1 % cream, Apply topically 2 times daily.  Do not apply to face or skin folds, do not use >21 days, Disp: 1 Tube, Rfl: 1    mirtazapine (REMERON) 7.5 MG tablet, Take 1 tablet by mouth nightly, Disp: 30 tablet, Rfl: 5    tamsulosin (FLOMAX) 0.4 MG capsule, Take 1 capsule by mouth daily, Disp: 30 capsule, Rfl: 5    simvastatin (ZOCOR) 40 MG tablet, Take 1 tablet by mouth nightly, Disp: 90 tablet, Rfl: 1    levothyroxine (SYNTHROID) 100 MCG tablet, Take 1 tablet by mouth Daily, Disp: 90 tablet, Rfl: 1    Multiple Vitamins-Minerals (THERAPEUTIC MULTIVITAMIN-MINERALS) tablet, Take 1 tablet by mouth daily, Disp: , Rfl:     No Known Allergies    Past Medical History:   Diagnosis Date    Arthritis     Enlarged prostate     Hyperlipidemia     Lower esophageal ring (Schatzki) 5/2/2019    Mixed hyperlipidemia 5/2/2019    Thyroid disease        Past Surgical History:   Procedure Laterality Date    CATARACT REMOVAL WITH IMPLANT Bilateral     ESOPHAGUS SURGERY      x 3    TOOTH EXTRACTION      UPPER GASTROINTESTINAL ENDOSCOPY N/A 7/7/2020    EGD WITH ESOPHAGEAL  DILATATION performed by Diana Solorio MD at [address] [place] [address]       History reviewed. No pertinent family history. Social History     Socioeconomic History    Marital status:      Spouse name: Not on file    Number of children: Not on file    Years of education: Not on file    Highest education level: Not on file   Occupational History    Not on file   Social Needs    Financial resource strain: Not on file    Food insecurity     Worry: Not on file     Inability: Not on file    Transportation needs     Medical: Not on file     Non-medical: Not on file   Tobacco Use    Smoking status: Never Smoker    Smokeless tobacco: Never Used   Substance and Sexual Activity    Alcohol use: No    Drug use: No    Sexual activity: Not on file   Lifestyle    Physical activity     Days per week: Not on file     Minutes per session: Not on file    Stress: Not on file   Relationships    Social connections     Talks on phone: Not on file     Gets together: Not on file     Attends Yazidi service: Not on file     Active member of club or organization: Not on file     Attends meetings of clubs or organizations: Not on file     Relationship status: Not on file    Intimate partner violence     Fear of current or ex partner: Not on file     Emotionally abused: Not on file     Physically abused: Not on file     Forced sexual activity: Not on file   Other Topics Concern    Not on file   Social History Narrative    Not on file       Vitals:    04/30/21 1007   BP: 118/62   Pulse: 78   Resp: 16   Temp: 97.6 °F (36.4 °C)   SpO2: 98%   Weight: 140 lb (63.5 kg)  Comment: 4/29/21   Height: 5' 10\" (1.778 m)       Exam:  Physical Exam  Constitutional:       Appearance: He is well-developed.    HENT: Head: Normocephalic and atraumatic. Right Ear: External ear normal.      Left Ear: External ear normal.   Eyes:      Pupils: Pupils are equal, round, and reactive to light. Neck:      Musculoskeletal: Normal range of motion and neck supple. Thyroid: No thyromegaly. Cardiovascular:      Rate and Rhythm: Normal rate and regular rhythm. Heart sounds: Normal heart sounds. No murmur. Pulmonary:      Effort: Pulmonary effort is normal.      Breath sounds: Normal breath sounds. No wheezing or rales. Abdominal:      General: Bowel sounds are normal.      Palpations: Abdomen is soft. Tenderness: There is no abdominal tenderness. There is no guarding or rebound. Musculoskeletal: Normal range of motion. Lymphadenopathy:      Cervical: No cervical adenopathy. Skin:     General: Skin is warm and dry. Neurological:      Mental Status: He is alert and oriented to person, place, and time. Cranial Nerves: No cranial nerve deficit.    Psychiatric:         Judgment: Judgment normal.         Assessment and Plan:    Diagnoses and all orders for this visit:    Moderate episode of recurrent major depressive disorder ([facility]  - PHQ 9 was positive today at a score of 15, CSSRS reviewed   - no current suicidal plans, reviewed to go to ER if thoughts.   - declines referral to psychology and psychiatry   - self stopped zoloft   - trial of remeron to see if helps appetite and sleep     Mixed hyperlipidemia  - watch diet   - on simvastatin   - follow labs     Acquired hypothyroidism  - on levothyroxine 100mcg   - follow labs (last 11/2020)      Chronic kidney disease, stage 3 ([facility]  - follow labs   - avoid NSAIDs    Anemia, chronic disease  - follow labs   - monitor for bleeding   - b12 was normal   - iron borderline low   - recommended iron 3x per week - patient self stopped   - follow labs     Lower esophageal ring (Schatzki)  - last EGD (7/2020)   - declined PPI     Vitamin D deficiency  - otc supplement - follow labs     Primary generalized (osteo)arthritis  - avoid NSAIDs   - tylenol as needed  - Disability placard letter given to patient   - stable     Elevated PSA  - uncertain as to reason - BPH or other   - uncertain as to utility of test was elevated in past - normal last year and now elevated again   - PSA again elevated (5/2020)   - referral to urology or further evaluation - understands risks   - retry flomax     Return in about 6 months (around 10/30/2021) for check up and review and labs. Orders Placed This Encounter   Procedures    CBC Auto Differential     Standing Status:   Future     Standing Expiration Date:   4/30/2022    Comprehensive Metabolic Panel     Standing Status:   Future     Standing Expiration Date:   4/30/2022    Lipid, Fasting     Standing Status:   Future     Standing Expiration Date:   4/30/2022    Magnesium     Standing Status:   Future     Standing Expiration Date:   4/30/2022    TSH without Reflex     Standing Status:   Future     Standing Expiration Date:   4/30/2022    Urinalysis     Standing Status:   Future     Standing Expiration Date:   4/30/2022    Vitamin B12 & Folate     Standing Status:   Future     Standing Expiration Date:   4/30/2022    Vitamin D 25 Hydroxy     Standing Status:   Future     Standing Expiration Date:   4/30/2022    Iron and TIBC     Standing Status:   Future     Standing Expiration Date:   4/30/2022     Order Specific Question:   Is Patient Fasting? Answer:   yes     Order Specific Question:   No of Hours?      Answer:   8    Ferritin     Standing Status:   Future     Standing Expiration Date:   4/30/2022    External Referral To Urology     Referral Priority:   Routine     Referral Type:   Eval and Treat     Referral Reason:   Specialty Services Required     Referred to Provider:   Michoacano Bush MD     Requested Specialty:   Urology     Number of Visits Requested:   1    Kerri Hill D.O, Dermatology, [place]oberto (DONALD)     Referral Priority:   Routine     Referral Type:   Eval and Treat     Referral Reason:   Specialty Services Required     Referred to Provider:   Michael Villasenor DO     Requested Specialty:   Dermatology     Number of Visits Requested:   1     Requested Prescriptions     Signed Prescriptions Disp Refills    triamcinolone (KENALOG) 0.1 % cream 1 Tube 1     Sig: Apply topically 2 times daily.  Do not apply to face or skin folds, do not use >21 days    mirtazapine (REMERON) 7.5 MG tablet 30 tablet 5     Sig: Take 1 tablet by mouth nightly    tamsulosin (FLOMAX) 0.4 MG capsule 30 capsule 5     Sig: Take 1 capsule by mouth daily    simvastatin (ZOCOR) 40 MG tablet 90 tablet 1     Sig: Take 1 tablet by mouth nightly    levothyroxine (SYNTHROID) 100 MCG tablet 90 tablet 1     Sig: Take 1 tablet by mouth Daily     Tammie Rodriguez MD  4/30/2021  1:28 PM

## 2021-05-04 ENCOUNTER — HOSPITAL ENCOUNTER (OUTPATIENT)
Age: 86
Setting detail: OUTPATIENT SURGERY
Discharge: HOME | End: 2021-05-04
Attending: INTERNAL MEDICINE | Admitting: INTERNAL MEDICINE
Payer: MEDICARE

## 2021-05-04 ENCOUNTER — ANESTHESIA (OUTPATIENT)
Dept: ENDOSCOPY | Age: 86
End: 2021-05-04
Payer: MEDICARE

## 2021-05-04 ENCOUNTER — ANESTHESIA EVENT (OUTPATIENT)
Dept: ENDOSCOPY | Age: 86
End: 2021-05-04
Payer: MEDICARE

## 2021-05-04 VITALS
WEIGHT: 140 LBS | OXYGEN SATURATION: 96 % | HEART RATE: 70 BPM | BODY MASS INDEX: 20.04 KG/M2 | DIASTOLIC BLOOD PRESSURE: 66 MMHG | HEIGHT: 70 IN | SYSTOLIC BLOOD PRESSURE: 134 MMHG | TEMPERATURE: 97.2 F | RESPIRATION RATE: 16 BRPM

## 2021-05-04 VITALS — SYSTOLIC BLOOD PRESSURE: 138 MMHG | DIASTOLIC BLOOD PRESSURE: 58 MMHG | OXYGEN SATURATION: 97 %

## 2021-05-04 DIAGNOSIS — Z01.818 PREOP TESTING: Primary | ICD-10-CM

## 2021-05-04 PROCEDURE — 3609017700 HC EGD DILATION GASTRIC/DUODENAL STRICTURE: Performed by: INTERNAL MEDICINE

## 2021-05-04 PROCEDURE — 6360000002 HC RX W HCPCS: Performed by: NURSE ANESTHETIST, CERTIFIED REGISTERED

## 2021-05-04 PROCEDURE — 2580000003 HC RX 258: Performed by: INTERNAL MEDICINE

## 2021-05-04 PROCEDURE — 7100000010 HC PHASE II RECOVERY - FIRST 15 MIN: Performed by: INTERNAL MEDICINE

## 2021-05-04 PROCEDURE — 2709999900 HC NON-CHARGEABLE SUPPLY: Performed by: INTERNAL MEDICINE

## 2021-05-04 PROCEDURE — 7100000011 HC PHASE II RECOVERY - ADDTL 15 MIN: Performed by: INTERNAL MEDICINE

## 2021-05-04 PROCEDURE — 3700000000 HC ANESTHESIA ATTENDED CARE: Performed by: INTERNAL MEDICINE

## 2021-05-04 PROCEDURE — 2500000003 HC RX 250 WO HCPCS: Performed by: NURSE ANESTHETIST, CERTIFIED REGISTERED

## 2021-05-04 PROCEDURE — 3700000001 HC ADD 15 MINUTES (ANESTHESIA): Performed by: INTERNAL MEDICINE

## 2021-05-04 RX ORDER — SODIUM CHLORIDE 9 MG/ML
25 INJECTION, SOLUTION INTRAVENOUS PRN
Status: DISCONTINUED | OUTPATIENT
Start: 2021-05-04 | End: 2021-05-04 | Stop reason: HOSPADM

## 2021-05-04 RX ORDER — PROPOFOL 10 MG/ML
INJECTION, EMULSION INTRAVENOUS PRN
Status: DISCONTINUED | OUTPATIENT
Start: 2021-05-04 | End: 2021-05-04 | Stop reason: SDUPTHER

## 2021-05-04 RX ORDER — LIDOCAINE HYDROCHLORIDE 10 MG/ML
INJECTION, SOLUTION INFILTRATION; PERINEURAL PRN
Status: DISCONTINUED | OUTPATIENT
Start: 2021-05-04 | End: 2021-05-04 | Stop reason: SDUPTHER

## 2021-05-04 RX ORDER — SODIUM CHLORIDE 0.9 % (FLUSH) 0.9 %
5-40 SYRINGE (ML) INJECTION PRN
Status: DISCONTINUED | OUTPATIENT
Start: 2021-05-04 | End: 2021-05-04 | Stop reason: HOSPADM

## 2021-05-04 RX ORDER — SODIUM CHLORIDE 0.9 % (FLUSH) 0.9 %
5-40 SYRINGE (ML) INJECTION EVERY 12 HOURS SCHEDULED
Status: DISCONTINUED | OUTPATIENT
Start: 2021-05-04 | End: 2021-05-04 | Stop reason: HOSPADM

## 2021-05-04 RX ORDER — SODIUM CHLORIDE 9 MG/ML
INJECTION, SOLUTION INTRAVENOUS CONTINUOUS
Status: DISCONTINUED | OUTPATIENT
Start: 2021-05-04 | End: 2021-05-04 | Stop reason: HOSPADM

## 2021-05-04 RX ADMIN — PROPOFOL 30 MG: 10 INJECTION, EMULSION INTRAVENOUS at 09:46

## 2021-05-04 RX ADMIN — LIDOCAINE HYDROCHLORIDE 20 MG: 10 INJECTION, SOLUTION INFILTRATION; PERINEURAL at 09:44

## 2021-05-04 RX ADMIN — PROPOFOL 40 MG: 10 INJECTION, EMULSION INTRAVENOUS at 09:44

## 2021-05-04 RX ADMIN — SODIUM CHLORIDE: 9 INJECTION, SOLUTION INTRAVENOUS at 09:21

## 2021-05-04 ASSESSMENT — LIFESTYLE VARIABLES: SMOKING_STATUS: 0

## 2021-05-04 ASSESSMENT — PAIN SCALES - GENERAL: PAINLEVEL_OUTOF10: 0

## 2021-05-04 NOTE — ANESTHESIA PRE PROCEDURE
Fatigue R53.83    Esophageal dysphagia R13.10    Immunization counseling Z71.89    Anemia, chronic disease D63.8    Chronic kidney disease, stage 3 N18.30    Mixed hyperlipidemia E78.2    Lower esophageal ring (Schatzki) K22.2    Vitamin D deficiency E55.9    Elevated PSA R97.20    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       Past Medical History:        Diagnosis Date    Arthritis     Enlarged prostate     Hyperlipidemia     Lower esophageal ring (Schatzki) 5/2/2019    Mixed hyperlipidemia 5/2/2019    Thyroid disease        Past Surgical History:        Procedure Laterality Date    CATARACT REMOVAL WITH IMPLANT Bilateral     ESOPHAGUS SURGERY      x 3    TOOTH EXTRACTION      UPPER GASTROINTESTINAL ENDOSCOPY N/A 7/7/2020    EGD WITH ESOPHAGEAL  DILATATION performed by Ilir Barker MD at [facility] ENDOSCOPY       Social History:    Social History     Tobacco Use    Smoking status: Never Smoker    Smokeless tobacco: Never Used   Substance Use Topics    Alcohol use: No                                Counseling given: Not Answered      Vital Signs (Current):   Vitals:    04/29/21 1111   Weight: 140 lb (63.5 kg)   Height: 5' 10\" (1.778 m)                                              BP Readings from Last 3 Encounters:   04/30/21 118/62   12/11/20 118/64   11/04/20 (!) 160/80       NPO Status:                                                                                 BMI:   Wt Readings from Last 3 Encounters:   04/29/21 140 lb (63.5 kg)   04/30/21 140 lb (63.5 kg)   12/11/20 150 lb 12.8 oz (68.4 kg)     Body mass index is 20.09 kg/m².     CBC:   Lab Results   Component Value Date    WBC 5.2 11/04/2020    RBC 4.24 11/04/2020    HGB 11.2 11/04/2020    HCT 38.8 11/04/2020    MCV 91.5 11/04/2020    RDW 18.0 11/04/2020     11/04/2020       CMP:   Lab Results   Component Value Date     11/04/2020    K 4.9 11/04/2020     11/04/2020    CO2 23 11/04/2020 BUN 22 11/04/2020    CREATININE 1.4 11/04/2020    GFRAA 57 11/04/2020    LABGLOM 47 11/04/2020    GLUCOSE 75 11/04/2020    PROT 7.5 11/04/2020    CALCIUM 9.8 11/04/2020    BILITOT 0.3 11/04/2020    ALKPHOS 56 11/04/2020    AST 19 11/04/2020    ALT 7 11/04/2020       POC Tests: No results for input(s): POCGLU, POCNA, POCK, POCCL, POCBUN, POCHEMO, POCHCT in the last 72 hours. Coags: No results found for: PROTIME, INR, APTT    HCG (If Applicable): No results found for: PREGTESTUR, PREGSERUM, HCG, HCGQUANT     ABGs: No results found for: PHART, PO2ART, LGQ3ZQB, AMZ9JGS, BEART, D7OAHFGI     Type & Screen (If Applicable):  No results found for: LABABO, LABRH    Drug/Infectious Status (If Applicable):  No results found for: HIV, HEPCAB    COVID-19 Screening (If Applicable):   Lab Results   Component Value Date    COVID19 Not Detected 04/29/2021           Anesthesia Evaluation  Patient summary reviewed and Nursing notes reviewed no history of anesthetic complications:   Airway: Mallampati: III  TM distance: >3 FB   Neck ROM: full  Mouth opening: > = 3 FB Dental:    (+) caps      Pulmonary:Negative Pulmonary ROS breath sounds clear to auscultation      (-) not a current smoker                           Cardiovascular:  Exercise tolerance: good (>4 METS),   (+) hyperlipidemia        Rhythm: regular  Rate: normal           Beta Blocker:  Not on Beta Blocker         Neuro/Psych:   (+) depression/anxiety             GI/Hepatic/Renal:            ROS comment: Dysphagia  BPH. Endo/Other:    (+) hypothyroidism: arthritis: OA., .                 Abdominal:           Vascular: negative vascular ROS. Anesthesia Plan      MAC     ASA 3       Induction: intravenous. Anesthetic plan and risks discussed with patient.                       Manuela Abdul MD   5/4/2021

## 2021-05-04 NOTE — PROGRESS NOTES
Patient discharged home with family, they verbalize understanding of discharge instructions and have no further questions at this time.

## 2021-05-04 NOTE — BRIEF OP NOTE
Brief Postoperative Note      Patient: Deirdre Lewis  YOB: 1929  MRN: [id number]    Date of Procedure: 5/4/2021    Pre-Op Diagnosis: STRICTURE    Post-Op Diagnosis: presumed ring, dilated by 50 and 47 Fr ledesma       Procedure(s):  EGD ESOPHAGOGASTRODUODENOSCOPY WITH DILATION    Surgeon(s):  Herson Martines MD    Assistant:  * No surgical staff found *    Anesthesia: Monitor Anesthesia Care    Estimated Blood Loss (mL): Minimal    Complications: None    Specimens:   * No specimens in log *    Implants:  * No implants in log *      Drains: * No LDAs found *    Findings: ring not apparent, empiric dilatiion    Electronically signed by Herson Martnies MD on 5/4/2021 at 9:56 AM

## 2021-05-04 NOTE — ANESTHESIA POSTPROCEDURE EVALUATION
Department of Anesthesiology  Postprocedure Note    Patient: Veneta Brunner  MRN: [id number]  YOB: 1929  Date of evaluation: 5/4/2021  Time:  10:55 AM     Procedure Summary     Date: 05/04/21 Room / Location: [address] / [facility]    Anesthesia Start: 2808 Anesthesia Stop: 1334    Procedure: EGD ESOPHAGOGASTRODUODENOSCOPY WITH DILATION (N/A ) Diagnosis: (STRICTURE)    Surgeons: Mildred Clemons MD Responsible Provider: Maribel Wellington MD    Anesthesia Type: MAC ASA Status: 3          Anesthesia Type: MAC    Letitia Phase I: Letitia Score: 10    Letitia Phase II: Letitia Score: 10    Last vitals: Reviewed and per EMR flowsheets.        Anesthesia Post Evaluation    Patient location during evaluation: PACU  Patient participation: complete - patient participated  Level of consciousness: awake and alert  Airway patency: patent  Nausea & Vomiting: no vomiting and no nausea  Complications: no  Cardiovascular status: blood pressure returned to baseline  Respiratory status: acceptable  Hydration status: euvolemic

## 2021-05-04 NOTE — INTERVAL H&P NOTE
Update History & Physical    The patient's History and Physical of April 27, 2021 was reviewed with the patient and I examined the patient. There was no change. The surgical site was confirmed by the patient and me. Plan: The risks, benefits, expected outcome, and alternative to the recommended procedure have been discussed with the patient. Patient understands and wants to proceed with the procedure.      Electronically signed by Jennifer Gray MD on 5/4/2021 at 9:55 AM

## 2021-05-05 LAB
SARS-COV-2: NOT DETECTED
SOURCE: NORMAL

## 2021-05-05 NOTE — OP NOTE
[address] [address]                                OPERATIVE REPORT    PATIENT NAME: Shira Gonsalez                    :        1929  MED REC NO:   [id number]                            ROOM:  ACCOUNT NO:   [de-identified]                           ADMIT DATE: 2021  PROVIDER:     Trish Morgan MD    DATE OF PROCEDURE:  2021    PROCEDURE PERFORMED:  Esophagogastroduodenoscopy plus esophageal  dilatation. REFERRING PROVIDER:  Myron Pabon MD.    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 POSTOPERATIVE DIAGNOSES:  1. The esophagus appears normal and I could not identify the ring on  this occasion, which was clearly photodocumented in the past, so  dilatation was performed. 2.  Normal stomach and duodenal bulb. INDICATION:  He is 89. He has had problems with dysphagia. He has been  dilated on at least three occasions in the past with the underlying  lesion that felt to be a ring. It was dilated the last time to 47  [place] by [facility] dilators. Preop is monitored anesthesia care. DESCRIPTION OF PROCEDURE:  He was placed left lateral and a bite block  was in place. The Olympus GIF-H190 video endoscope was guided into the  cervical esophagus without difficulty. On this occasion, the  gastroesophageal junction did not show evidence of a ring. It was  previously clearly seen and photo documented. There was no  significant hiatal hernia. The stomach throughout including retroflexed  views of the fundus and cardia were normal.  The proximal duodenum  limited to the bulb was normal.  The endoscope was withdrawn. A lubricated 48 Fr Dupont dilator was passed and  met no resistance or  returned no blood. A 54-[place] Dupont dilator was lubricated, passed  in a similar fashion. Again no blood. No resistance. Terminated and  well tolerated. EBL: 0      IMPRESSION:  Dilates like a ring. Behaves like a ring and the ring has  been seen before, but I do not see one now, but still I suspect his  symptoms will be resolved at least in the short term but they seemed to  recur at a rate I would not expect.         Kourtney Coulter MD    D: 05/04/2021 10:21:10       T: 05/04/2021 10:28:31     [email or web address]  Job#: [id number]     Doc#: [id number]    CC:

## 2021-06-01 RX ORDER — SODIUM CHLORIDE 0.9 % (FLUSH) 0.9 %
5-40 SYRINGE (ML) INJECTION EVERY 12 HOURS SCHEDULED
Status: CANCELLED | OUTPATIENT
Start: 2021-06-01

## 2021-06-01 RX ORDER — SODIUM CHLORIDE 9 MG/ML
INJECTION, SOLUTION INTRAVENOUS CONTINUOUS
Status: CANCELLED | OUTPATIENT
Start: 2021-06-01

## 2021-06-01 RX ORDER — SODIUM CHLORIDE 9 MG/ML
25 INJECTION, SOLUTION INTRAVENOUS PRN
Status: CANCELLED | OUTPATIENT
Start: 2021-06-01

## 2021-06-01 RX ORDER — SODIUM CHLORIDE 0.9 % (FLUSH) 0.9 %
5-40 SYRINGE (ML) INJECTION PRN
Status: CANCELLED | OUTPATIENT
Start: 2021-06-01

## 2021-06-04 RX ORDER — ASPIRIN 81 MG/1
81 TABLET ORAL DAILY
COMMUNITY
End: 2021-08-16 | Stop reason: ALTCHOICE

## 2021-06-04 NOTE — PROGRESS NOTES
notify you if your arrival time changes    [x] If you receive a survey after surgery we would greatly appreciate your comments    [] Parent/guardian of a minor must accompany their child and remain on the premises  the entire time they are under our care     [] Pediatric patients may bring favorite toy, blanket or comfort item with them    [] A caregiver or family member must remain with the patient during their stay if they are mentally handicapped, have dementia, disoriented or unable to use a call light or would be a safety concern if left unattended    [x] Please notify surgeon if you develop any illness between now and time of surgery (cold, cough, sore throat, fever, nausea, vomiting) or any signs of infections  including skin, wounds, and dental.    [x]  The Outpatient Pharmacy is available to fill your prescription here on your day of surgery, ask your preop nurse for details    [x] Other instructions: wear loose, comfortable clothing  EDUCATIONAL MATERIALS PROVIDED:    [] PAT Preoperative Education Packet/Booklet     [] Medication List    [] Transfusion bracelet applied with instructions    [] Shower with soap, lather and rinse well, and use CHG wipes provided the evening before surgery as instructed    [] Incentive spirometer with instructions

## 2021-06-04 NOTE — PROGRESS NOTES
Upon reviewing treatment consent orders, found under encounter for 6/7/2021 that Dr Yajaira Chung name was there for the cysto encounter. ..spoke with surgery scheduling and they referred me to [facility]. . called [facility] to correct encounter, the message will be given to Sharron Caruso per office. need for admit orders.

## 2021-06-04 NOTE — PROGRESS NOTES
Have you been tested for COVID  No    vaccinated       Have you been told you were positive for COVID No  Have you had any known exposure to someone that is positive for COVID No  Do you have a cough                   No              Do you have shortness of breath No                 Do you have a sore throat            No                Are you having chills                    No                Are you having muscle aches. No                    Please come to the hospital wearing a mask and have your significant other wear a mask as well. Both of you should check your temperature before leaving to come here,  if it is 100 or higher please call [phone] for instruction.

## 2021-06-07 ENCOUNTER — HOSPITAL ENCOUNTER (OUTPATIENT)
Age: 86
Setting detail: OBSERVATION
Discharge: HOME | End: 2021-06-08
Attending: UROLOGY | Admitting: UROLOGY
Payer: MEDICARE

## 2021-06-07 ENCOUNTER — ANESTHESIA EVENT (OUTPATIENT)
Dept: OPERATING ROOM | Age: 86
End: 2021-06-07
Payer: MEDICARE

## 2021-06-07 ENCOUNTER — HOSPITAL ENCOUNTER (OUTPATIENT)
Dept: GENERAL RADIOLOGY | Age: 86
Setting detail: OUTPATIENT SURGERY
Discharge: HOME | End: 2021-06-09
Attending: UROLOGY
Payer: MEDICARE

## 2021-06-07 ENCOUNTER — ANESTHESIA (OUTPATIENT)
Dept: OPERATING ROOM | Age: 86
End: 2021-06-07
Payer: MEDICARE

## 2021-06-07 VITALS
OXYGEN SATURATION: 100 % | DIASTOLIC BLOOD PRESSURE: 73 MMHG | RESPIRATION RATE: 3 BRPM | SYSTOLIC BLOOD PRESSURE: 152 MMHG | TEMPERATURE: 96.3 F

## 2021-06-07 DIAGNOSIS — G89.18 POST-OP PAIN: Primary | ICD-10-CM

## 2021-06-07 DIAGNOSIS — R52 PAIN: ICD-10-CM

## 2021-06-07 PROBLEM — N13.8 BPH WITH URINARY OBSTRUCTION: Status: ACTIVE | Noted: 2021-06-07

## 2021-06-07 PROBLEM — N40.1 BPH WITH URINARY OBSTRUCTION: Status: ACTIVE | Noted: 2021-06-07

## 2021-06-07 LAB
EKG ATRIAL RATE: 66 BPM
EKG P AXIS: 66 DEGREES
EKG P-R INTERVAL: 152 MS
EKG Q-T INTERVAL: 398 MS
EKG QRS DURATION: 84 MS
EKG QTC CALCULATION (BAZETT): 417 MS
EKG R AXIS: -13 DEGREES
EKG T AXIS: 39 DEGREES
EKG VENTRICULAR RATE: 66 BPM
HCT VFR BLD CALC: 33.4 % (ref 37–54)
HEMOGLOBIN: 10.3 G/DL (ref 12.5–16.5)
MCH RBC QN AUTO: 27 PG (ref 26–35)
MCHC RBC AUTO-ENTMCNC: 30.8 % (ref 32–34.5)
MCV RBC AUTO: 87.7 FL (ref 80–99.9)
PDW BLD-RTO: 15.9 FL (ref 11.5–15)
PLATELET # BLD: 177 E9/L (ref 130–450)
PMV BLD AUTO: 9.7 FL (ref 7–12)
RBC # BLD: 3.81 E12/L (ref 3.8–5.8)
WBC # BLD: 4.7 E9/L (ref 4.5–11.5)

## 2021-06-07 PROCEDURE — 6360000004 HC RX CONTRAST MEDICATION: Performed by: UROLOGY

## 2021-06-07 PROCEDURE — 2580000003 HC RX 258: Performed by: NURSE PRACTITIONER

## 2021-06-07 PROCEDURE — 2720000010 HC SURG SUPPLY STERILE: Performed by: UROLOGY

## 2021-06-07 PROCEDURE — 7100000000 HC PACU RECOVERY - FIRST 15 MIN: Performed by: UROLOGY

## 2021-06-07 PROCEDURE — 6360000002 HC RX W HCPCS: Performed by: NURSE PRACTITIONER

## 2021-06-07 PROCEDURE — 3600000013 HC SURGERY LEVEL 3 ADDTL 15MIN: Performed by: UROLOGY

## 2021-06-07 PROCEDURE — 3700000001 HC ADD 15 MINUTES (ANESTHESIA): Performed by: UROLOGY

## 2021-06-07 PROCEDURE — 3600000003 HC SURGERY LEVEL 3 BASE: Performed by: UROLOGY

## 2021-06-07 PROCEDURE — 93005 ELECTROCARDIOGRAM TRACING: CPT | Performed by: ANESTHESIOLOGY

## 2021-06-07 PROCEDURE — 2580000003 HC RX 258: Performed by: UROLOGY

## 2021-06-07 PROCEDURE — 74420 UROGRAPHY RTRGR +-KUB: CPT

## 2021-06-07 PROCEDURE — 36415 COLL VENOUS BLD VENIPUNCTURE: CPT

## 2021-06-07 PROCEDURE — 87088 URINE BACTERIA CULTURE: CPT

## 2021-06-07 PROCEDURE — 2709999900 HC NON-CHARGEABLE SUPPLY: Performed by: UROLOGY

## 2021-06-07 PROCEDURE — 2500000003 HC RX 250 WO HCPCS: Performed by: NURSE ANESTHETIST, CERTIFIED REGISTERED

## 2021-06-07 PROCEDURE — 6360000002 HC RX W HCPCS: Performed by: UROLOGY

## 2021-06-07 PROCEDURE — 7100000001 HC PACU RECOVERY - ADDTL 15 MIN: Performed by: UROLOGY

## 2021-06-07 PROCEDURE — 85027 COMPLETE CBC AUTOMATED: CPT

## 2021-06-07 PROCEDURE — G0378 HOSPITAL OBSERVATION PER HR: HCPCS

## 2021-06-07 PROCEDURE — 6370000000 HC RX 637 (ALT 250 FOR IP): Performed by: UROLOGY

## 2021-06-07 PROCEDURE — 93010 ELECTROCARDIOGRAM REPORT: CPT | Performed by: INTERNAL MEDICINE

## 2021-06-07 PROCEDURE — 3700000000 HC ANESTHESIA ATTENDED CARE: Performed by: UROLOGY

## 2021-06-07 PROCEDURE — 6360000002 HC RX W HCPCS: Performed by: NURSE ANESTHETIST, CERTIFIED REGISTERED

## 2021-06-07 PROCEDURE — 88305 TISSUE EXAM BY PATHOLOGIST: CPT

## 2021-06-07 RX ORDER — SODIUM CHLORIDE, SODIUM LACTATE, POTASSIUM CHLORIDE, CALCIUM CHLORIDE 600; 310; 30; 20 MG/100ML; MG/100ML; MG/100ML; MG/100ML
INJECTION, SOLUTION INTRAVENOUS CONTINUOUS
Status: DISCONTINUED | OUTPATIENT
Start: 2021-06-07 | End: 2021-06-08 | Stop reason: HOSPADM

## 2021-06-07 RX ORDER — DEXAMETHASONE SODIUM PHOSPHATE 4 MG/ML
INJECTION, SOLUTION INTRA-ARTICULAR; INTRALESIONAL; INTRAMUSCULAR; INTRAVENOUS; SOFT TISSUE PRN
Status: DISCONTINUED | OUTPATIENT
Start: 2021-06-07 | End: 2021-06-07 | Stop reason: SDUPTHER

## 2021-06-07 RX ORDER — MEPERIDINE HYDROCHLORIDE 25 MG/ML
12.5 INJECTION INTRAMUSCULAR; INTRAVENOUS; SUBCUTANEOUS EVERY 5 MIN PRN
Status: DISCONTINUED | OUTPATIENT
Start: 2021-06-07 | End: 2021-06-07 | Stop reason: SDUPTHER

## 2021-06-07 RX ORDER — LIDOCAINE HYDROCHLORIDE 20 MG/ML
INJECTION, SOLUTION EPIDURAL; INFILTRATION; INTRACAUDAL; PERINEURAL PRN
Status: DISCONTINUED | OUTPATIENT
Start: 2021-06-07 | End: 2021-06-07 | Stop reason: SDUPTHER

## 2021-06-07 RX ORDER — LEVOTHYROXINE SODIUM 0.1 MG/1
100 TABLET ORAL DAILY
Status: DISCONTINUED | OUTPATIENT
Start: 2021-06-07 | End: 2021-06-08 | Stop reason: HOSPADM

## 2021-06-07 RX ORDER — PROPOFOL 10 MG/ML
INJECTION, EMULSION INTRAVENOUS PRN
Status: DISCONTINUED | OUTPATIENT
Start: 2021-06-07 | End: 2021-06-07 | Stop reason: SDUPTHER

## 2021-06-07 RX ORDER — OXYCODONE HYDROCHLORIDE AND ACETAMINOPHEN 5; 325 MG/1; MG/1
1 TABLET ORAL EVERY 4 HOURS PRN
Status: DISCONTINUED | OUTPATIENT
Start: 2021-06-07 | End: 2021-06-08 | Stop reason: HOSPADM

## 2021-06-07 RX ORDER — SODIUM CHLORIDE 9 MG/ML
25 INJECTION, SOLUTION INTRAVENOUS PRN
Status: DISCONTINUED | OUTPATIENT
Start: 2021-06-07 | End: 2021-06-08 | Stop reason: HOSPADM

## 2021-06-07 RX ORDER — ONDANSETRON 2 MG/ML
INJECTION INTRAMUSCULAR; INTRAVENOUS PRN
Status: DISCONTINUED | OUTPATIENT
Start: 2021-06-07 | End: 2021-06-07 | Stop reason: SDUPTHER

## 2021-06-07 RX ORDER — FENTANYL CITRATE 50 UG/ML
INJECTION, SOLUTION INTRAMUSCULAR; INTRAVENOUS PRN
Status: DISCONTINUED | OUTPATIENT
Start: 2021-06-07 | End: 2021-06-07 | Stop reason: SDUPTHER

## 2021-06-07 RX ORDER — SODIUM CHLORIDE 0.9 % (FLUSH) 0.9 %
5-40 SYRINGE (ML) INJECTION PRN
Status: DISCONTINUED | OUTPATIENT
Start: 2021-06-07 | End: 2021-06-08 | Stop reason: HOSPADM

## 2021-06-07 RX ORDER — ONDANSETRON 2 MG/ML
4 INJECTION INTRAMUSCULAR; INTRAVENOUS
Status: DISCONTINUED | OUTPATIENT
Start: 2021-06-07 | End: 2021-06-07 | Stop reason: HOSPADM

## 2021-06-07 RX ORDER — MIRTAZAPINE 15 MG/1
7.5 TABLET, FILM COATED ORAL NIGHTLY
Status: DISCONTINUED | OUTPATIENT
Start: 2021-06-07 | End: 2021-06-08 | Stop reason: HOSPADM

## 2021-06-07 RX ORDER — SODIUM CHLORIDE 0.9 % (FLUSH) 0.9 %
5-40 SYRINGE (ML) INJECTION EVERY 12 HOURS SCHEDULED
Status: DISCONTINUED | OUTPATIENT
Start: 2021-06-07 | End: 2021-06-08 | Stop reason: HOSPADM

## 2021-06-07 RX ORDER — ONDANSETRON 2 MG/ML
4 INJECTION INTRAMUSCULAR; INTRAVENOUS
Status: DISCONTINUED | OUTPATIENT
Start: 2021-06-07 | End: 2021-06-07 | Stop reason: SDUPTHER

## 2021-06-07 RX ORDER — MEPERIDINE HYDROCHLORIDE 25 MG/ML
12.5 INJECTION INTRAMUSCULAR; INTRAVENOUS; SUBCUTANEOUS EVERY 5 MIN PRN
Status: DISCONTINUED | OUTPATIENT
Start: 2021-06-07 | End: 2021-06-07 | Stop reason: HOSPADM

## 2021-06-07 RX ORDER — ATORVASTATIN CALCIUM 10 MG/1
10 TABLET, FILM COATED ORAL DAILY
Status: DISCONTINUED | OUTPATIENT
Start: 2021-06-07 | End: 2021-06-08 | Stop reason: HOSPADM

## 2021-06-07 RX ORDER — SODIUM CHLORIDE 9 MG/ML
INJECTION, SOLUTION INTRAVENOUS CONTINUOUS
Status: DISCONTINUED | OUTPATIENT
Start: 2021-06-07 | End: 2021-06-08 | Stop reason: HOSPADM

## 2021-06-07 RX ADMIN — FENTANYL CITRATE 50 MCG: 50 INJECTION, SOLUTION INTRAMUSCULAR; INTRAVENOUS at 12:14

## 2021-06-07 RX ADMIN — DEXAMETHASONE SODIUM PHOSPHATE 10 MG: 4 INJECTION, SOLUTION INTRAMUSCULAR; INTRAVENOUS at 12:00

## 2021-06-07 RX ADMIN — OXYCODONE HYDROCHLORIDE AND ACETAMINOPHEN 1 TABLET: 5; 325 TABLET ORAL at 23:37

## 2021-06-07 RX ADMIN — LIDOCAINE HYDROCHLORIDE 80 MG: 20 INJECTION, SOLUTION EPIDURAL; INFILTRATION; INTRACAUDAL; PERINEURAL at 11:53

## 2021-06-07 RX ADMIN — FENTANYL CITRATE 50 MCG: 50 INJECTION, SOLUTION INTRAMUSCULAR; INTRAVENOUS at 12:05

## 2021-06-07 RX ADMIN — Medication 2000 MG: at 11:58

## 2021-06-07 RX ADMIN — SODIUM CHLORIDE, POTASSIUM CHLORIDE, SODIUM LACTATE AND CALCIUM CHLORIDE: 600; 310; 30; 20 INJECTION, SOLUTION INTRAVENOUS at 23:40

## 2021-06-07 RX ADMIN — PROPOFOL 150 MG: 10 INJECTION, EMULSION INTRAVENOUS at 11:53

## 2021-06-07 RX ADMIN — SODIUM CHLORIDE: 9 INJECTION, SOLUTION INTRAVENOUS at 11:44

## 2021-06-07 RX ADMIN — SODIUM CHLORIDE, POTASSIUM CHLORIDE, SODIUM LACTATE AND CALCIUM CHLORIDE: 600; 310; 30; 20 INJECTION, SOLUTION INTRAVENOUS at 17:35

## 2021-06-07 RX ADMIN — ONDANSETRON 4 MG: 2 INJECTION INTRAMUSCULAR; INTRAVENOUS at 12:39

## 2021-06-07 RX ADMIN — WATER 1000 MG: 1 INJECTION INTRAMUSCULAR; INTRAVENOUS; SUBCUTANEOUS at 20:26

## 2021-06-07 RX ADMIN — MIRTAZAPINE 7.5 MG: 15 TABLET, FILM COATED ORAL at 20:26

## 2021-06-07 ASSESSMENT — PULMONARY FUNCTION TESTS
PIF_VALUE: 15
PIF_VALUE: 2
PIF_VALUE: 16
PIF_VALUE: 19
PIF_VALUE: 15
PIF_VALUE: 15
PIF_VALUE: 16
PIF_VALUE: 15
PIF_VALUE: 16
PIF_VALUE: 15
PIF_VALUE: 14
PIF_VALUE: 15
PIF_VALUE: 0
PIF_VALUE: 15
PIF_VALUE: 2
PIF_VALUE: 15
PIF_VALUE: 17
PIF_VALUE: 5
PIF_VALUE: 17
PIF_VALUE: 15
PIF_VALUE: 16
PIF_VALUE: 15
PIF_VALUE: 16
PIF_VALUE: 15
PIF_VALUE: 15
PIF_VALUE: 7
PIF_VALUE: 15
PIF_VALUE: 14
PIF_VALUE: 14
PIF_VALUE: 0
PIF_VALUE: 16
PIF_VALUE: 0
PIF_VALUE: 16
PIF_VALUE: 15
PIF_VALUE: 14
PIF_VALUE: 1
PIF_VALUE: 15
PIF_VALUE: 15
PIF_VALUE: 2
PIF_VALUE: 1
PIF_VALUE: 16
PIF_VALUE: 15
PIF_VALUE: 17
PIF_VALUE: 15
PIF_VALUE: 16
PIF_VALUE: 16
PIF_VALUE: 0
PIF_VALUE: 1
PIF_VALUE: 15
PIF_VALUE: 1
PIF_VALUE: 15
PIF_VALUE: 0
PIF_VALUE: 0

## 2021-06-07 ASSESSMENT — PAIN DESCRIPTION - LOCATION: LOCATION: ABDOMEN

## 2021-06-07 ASSESSMENT — PAIN SCALES - GENERAL
PAINLEVEL_OUTOF10: 0
PAINLEVEL_OUTOF10: 4
PAINLEVEL_OUTOF10: 0

## 2021-06-07 ASSESSMENT — PAIN DESCRIPTION - ORIENTATION: ORIENTATION: LOWER

## 2021-06-07 ASSESSMENT — PAIN - FUNCTIONAL ASSESSMENT
PAIN_FUNCTIONAL_ASSESSMENT: ACTIVITIES ARE NOT PREVENTED
PAIN_FUNCTIONAL_ASSESSMENT: 0-10

## 2021-06-07 ASSESSMENT — LIFESTYLE VARIABLES: SMOKING_STATUS: 0

## 2021-06-07 ASSESSMENT — PAIN DESCRIPTION - ONSET: ONSET: GRADUAL

## 2021-06-07 ASSESSMENT — PAIN DESCRIPTION - DESCRIPTORS: DESCRIPTORS: DISCOMFORT

## 2021-06-07 ASSESSMENT — PAIN DESCRIPTION - PAIN TYPE: TYPE: ACUTE PAIN;SURGICAL PAIN

## 2021-06-07 ASSESSMENT — PAIN DESCRIPTION - FREQUENCY: FREQUENCY: INTERMITTENT

## 2021-06-07 ASSESSMENT — PAIN DESCRIPTION - PROGRESSION: CLINICAL_PROGRESSION: NOT CHANGED

## 2021-06-07 NOTE — OP NOTE
on continuous drainage  and irrigation. The patient tolerated the procedure well and was sent  to the recovery room in satisfactory condition.         Nicolás Campuzano MD    D: 06/07/2021 13:12:59       T: 06/07/2021 13:17:18     [email or web address]  Job#: [id number]     Doc#: [id number]    CC:

## 2021-06-07 NOTE — ANESTHESIA POSTPROCEDURE EVALUATION
Department of Anesthesiology  Postprocedure Note    Patient: Quincy Joyner  MRN: [id number]  YOB: 1929  Date of evaluation: 6/7/2021  Time:  4:38 PM     Procedure Summary     Date: 06/07/21 Room / Location: [facility] / [facility]    Anesthesia Start: 1828 Anesthesia Stop: 1301    Procedures:       CYSTOSCOPY RETROGRADE PYELOGRAMS TRANSURETHRAL RESECTION PROSTATE (N/A )      PROSTATE BIOPSY (N/A ) Diagnosis: (BPH ELAVATED PSA)    Surgeons: Jaziel Gao MD Responsible Provider: Felicita Villanueva MD    Anesthesia Type: general ASA Status: 3          Anesthesia Type: general    Letitia Phase I: Letitia Score: 10    Letitia Phase II:      Last vitals: Reviewed and per EMR flowsheets.        Anesthesia Post Evaluation    Patient location during evaluation: PACU  Patient participation: complete - patient participated  Level of consciousness: awake and alert  Airway patency: patent  Nausea & Vomiting: no vomiting and no nausea  Complications: no  Cardiovascular status: hemodynamically stable  Respiratory status: acceptable  Hydration status: stable

## 2021-06-07 NOTE — BRIEF OP NOTE
Brief Postoperative Note    Maldonado Galvez  YOB: 1929  [id number]    Pre-operative Diagnosis: portillo  Possible ca prostate    Post-operative Diagnosis: Same    Procedure: cysto retro turp bx prostate    Anesthesia: General    Surgeons/Assistants: Kiki Weldon MD      Estimated Blood Loss: less than 50     Complications: None    Specimens: Was Obtained:     Findings:     Electronically signed by Kiki Weldon MD on 6/7/2021 at 12:51 PM 44 yo woman with persistent dry "hacking" cough no fever x 2 weeks worse over last several days.     no sob no cp no abd no n/v/d     non smoker

## 2021-06-07 NOTE — ANESTHESIA PRE PROCEDURE
Department of Anesthesiology  Preprocedure Note       Name:  Watson Garg   Age:  80 y.o.  :  1929                                          MRN:  [id number]         Date:  2021      Surgeon: Logan Charles):  Mustapha Rader MD    Procedure: Procedure(s):  CYSTOSCOPY RETROGRADE PYELOGRAMS TRANSURETHRAL RESECTION PROSTATE  PROSTATE BIOPSY    Medications prior to admission:   Prior to Admission medications    Medication Sig Start Date End Date Taking? Authorizing Provider   aspirin 81 MG EC tablet Take 81 mg by mouth daily    Historical Provider, MD   triamcinolone (KENALOG) 0.1 % cream Apply topically 2 times daily. Do not apply to face or skin folds, do not use >21 days 21   Clare Kuzn MD   mirtazapine (REMERON) 7.5 MG tablet Take 1 tablet by mouth nightly 21   Clare Kunz MD   tamsulosin [facility]) 0.4 MG capsule Take 1 capsule by mouth daily 21   Clare Kunz MD   simvastatin (ZOCOR) 40 MG tablet Take 1 tablet by mouth nightly 21   Clare Kunz MD   levothyroxine (SYNTHROID) 100 MCG tablet Take 1 tablet by mouth Daily 21   Clare Kunz MD   Multiple Vitamins-Minerals (THERAPEUTIC MULTIVITAMIN-MINERALS) tablet Take 1 tablet by mouth daily    Historical Provider, MD       Current medications:    No current facility-administered medications for this visit. No current outpatient medications on file.      Facility-Administered Medications Ordered in Other Visits   Medication Dose Route Frequency Provider Last Rate Last Admin    0.9 % sodium chloride infusion   Intravenous Continuous DELMER Ayon CNP        0.9 % sodium chloride infusion  25 mL Intravenous PRN DELMER Ayon CNP        ceFAZolin (ANCEF) 2000 mg in sterile water 20 mL IV syringe  2,000 mg Intravenous On Call to [address], APRN - CNP        sodium chloride flush 0.9 % injection 5-40 mL  5-40 mL Intravenous 2 times per day DELMER Ayon CNP  sodium chloride flush 0.9 % injection 5-40 mL  5-40 mL Intravenous PRN DELMER Ayon CNP           Allergies:  No Known Allergies    Problem List:    Patient Active Problem List   Diagnosis Code    Chronic right shoulder pain M25.511, G89.29    Primary generalized (osteo)arthritis M15.0    Acquired hypothyroidism E03.9    Encounter for immunization Z23    Prostate enlargement N40.0    Fatigue R53.83    Esophageal dysphagia R13.10    Immunization counseling Z71.89    Anemia, chronic disease D63.8    Chronic kidney disease, stage 3 (HCC) N18.30    Mixed hyperlipidemia E78.2    Lower esophageal ring (Schatzki) K22.2    Vitamin D deficiency E55.9    Elevated PSA R97.20    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       Past Medical History:        Diagnosis Date    Anemia     Arthritis     generalized    CKD (chronic kidney disease)     Dysphagia     has esophageal dilitations every 6-8 monhts    Enlarged prostate     BPH, dysuria    Hyperlipidemia     Lower esophageal ring (Schatzki) 05/02/2019    Mixed hyperlipidemia 5/2/2019    Thyroid disease        Past Surgical History:        Procedure Laterality Date    CATARACT REMOVAL WITH IMPLANT Bilateral     ESOPHAGUS SURGERY      x 3    TOOTH EXTRACTION      UPPER GASTROINTESTINAL ENDOSCOPY N/A 7/7/2020    EGD WITH ESOPHAGEAL  DILATATION performed by Darrell Gamboa MD at [facility] ENDOSCOPY    UPPER GASTROINTESTINAL ENDOSCOPY N/A 5/4/2021    EGD ESOPHAGOGASTRODUODENOSCOPY WITH DILATION performed by Darrell Gamboa MD at [facility] ENDOSCOPY       Social History:    Social History     Tobacco Use    Smoking status: Never Smoker    Smokeless tobacco: Never Used   Substance Use Topics    Alcohol use: Not Currently                                Counseling given: Not Answered      Vital Signs (Current): There were no vitals filed for this visit.                                            BP Readings from Last 3 good (>4 METS),   (+) hyperlipidemia    (-) past MI and CAD    ECG reviewed  Rhythm: regular  Rate: normal           Beta Blocker:  Not on Beta Blocker         Neuro/Psych:   (+) psychiatric history: stable with treatmentdepression/anxiety             GI/Hepatic/Renal:        (-) no renal disease      ROS comment: Dysphagia  BPH. Endo/Other:    (+) hypothyroidism: arthritis: OA., .                 Abdominal:         (-) obese     Vascular: negative vascular ROS. Anesthesia Plan      general     ASA 3       Induction: intravenous. BIS  MIPS: Postoperative opioids intended and Prophylactic antiemetics administered. Anesthetic plan and risks discussed with patient and child/children. Plan discussed with CRNA.                   Emily De León MD   6/7/2021

## 2021-06-08 VITALS
SYSTOLIC BLOOD PRESSURE: 116 MMHG | OXYGEN SATURATION: 97 % | HEIGHT: 70 IN | TEMPERATURE: 98.8 F | BODY MASS INDEX: 20.31 KG/M2 | HEART RATE: 79 BPM | WEIGHT: 141.9 LBS | DIASTOLIC BLOOD PRESSURE: 56 MMHG | RESPIRATION RATE: 16 BRPM

## 2021-06-08 PROCEDURE — G0378 HOSPITAL OBSERVATION PER HR: HCPCS

## 2021-06-08 PROCEDURE — 2580000003 HC RX 258: Performed by: UROLOGY

## 2021-06-08 PROCEDURE — 6360000002 HC RX W HCPCS: Performed by: UROLOGY

## 2021-06-08 PROCEDURE — 6370000000 HC RX 637 (ALT 250 FOR IP): Performed by: UROLOGY

## 2021-06-08 RX ORDER — OXYCODONE HYDROCHLORIDE AND ACETAMINOPHEN 5; 325 MG/1; MG/1
1 TABLET ORAL EVERY 6 HOURS PRN
Qty: 12 TABLET | Refills: 0 | Status: SHIPPED | OUTPATIENT
Start: 2021-06-08 | End: 2021-06-11

## 2021-06-08 RX ORDER — CEPHALEXIN 250 MG/1
250 CAPSULE ORAL 3 TIMES DAILY
Qty: 15 CAPSULE | Refills: 0 | Status: SHIPPED | OUTPATIENT
Start: 2021-06-08 | End: 2021-06-13

## 2021-06-08 RX ADMIN — LEVOTHYROXINE SODIUM 100 MCG: 100 TABLET ORAL at 06:11

## 2021-06-08 RX ADMIN — WATER 1000 MG: 1 INJECTION INTRAMUSCULAR; INTRAVENOUS; SUBCUTANEOUS at 11:23

## 2021-06-08 RX ADMIN — WATER 1000 MG: 1 INJECTION INTRAMUSCULAR; INTRAVENOUS; SUBCUTANEOUS at 04:25

## 2021-06-08 RX ADMIN — SODIUM CHLORIDE, POTASSIUM CHLORIDE, SODIUM LACTATE AND CALCIUM CHLORIDE: 600; 310; 30; 20 INJECTION, SOLUTION INTRAVENOUS at 08:31

## 2021-06-08 RX ADMIN — ATORVASTATIN CALCIUM 10 MG: 10 TABLET, FILM COATED ORAL at 08:30

## 2021-06-08 ASSESSMENT — PAIN SCALES - GENERAL: PAINLEVEL_OUTOF10: 1

## 2021-06-08 NOTE — PROGRESS NOTES
6/8/2021 11:54 AM  Service: Urology  Group: [facility] ([facility])    Arnold Herrera  [id number]    Subjective:    Awake and alert   Denies pain or discomfort  Tolerated lunch  Has not ambulated yet  Lobo draining clear urine with CBI at slow rate     Review of Systems  Constitutional: No fever or chills   Respiratory: negative for cough and hemoptysis  Cardiovascular: negative for chest pain and dyspnea  Gastrointestinal: negative for abdominal pain, diarrhea, nausea and vomiting   : See above  Derm: negative for rash and skin lesion(s)  Neurological: negative for seizures and tremors  Musculoskeletal: Negative    Psychiatric: Negative   All other reviews are negative      Scheduled Meds:   sodium chloride flush  5-40 mL Intravenous 2 times per day    levothyroxine  100 mcg Oral Daily    mirtazapine  7.5 mg Oral Nightly    atorvastatin  10 mg Oral Daily    ceFAZolin  1,000 mg Intravenous Q8H       Objective:  Vitals:    06/08/21 0815   BP: (!) 116/56   Pulse: 79   Resp: 16   Temp: 98.8 °F (37.1 °C)   SpO2: 97%         Allergies: Patient has no known allergies. General Appearance: alert and oriented to person, place and time and in no acute distress  Skin: no rash or erythema  Head: normocephalic and atraumatic  Pulmonary/Chest: normal air movement, no respiratory distress  Abdomen: soft, non-tender, non-distended  Genitourinary: lobo draining clear urine with CBI at slow rate   Extremities: no cyanosis, clubbing or edema         Labs:     No results for input(s): NA, K, CL, CO2, BUN, CREATININE, GLUCOSE, CALCIUM in the last 72 hours.     Lab Results   Component Value Date    HGB 10.3 06/07/2021    HCT 33.4 06/07/2021         Assessment/Plan:  POD#1 cystoscopy panendoscopy, retrograde pyelogram, transurethral resection prostate, and biopsy of prostate transrectally    Stop CBI  Cap third port  Encourage ambulation   Provide with leg strap and leg bag as well as standard bag for nighttime use  Scripts in chart  Will follow up on Friday for lboo removal   DC home today if ambulating well       Pricila Khan, APRN - CNP   [facility]

## 2021-06-08 NOTE — PROGRESS NOTES
CLINICAL PHARMACY NOTE: MEDS TO BEDS    Total # of Prescriptions Filled: 2   The following medications were delivered to the patient:  · Cephalexin 250mg  · Percocet 5/325mg    Additional Documentation:

## 2021-06-08 NOTE — PATIENT CARE CONFERENCE
[facility] Quality Flow/Interdisciplinary Rounds Progress Note        Quality Flow Rounds held on June 8, 2021    Disciplines Attending:  Bedside Nurse, ,  and Nursing Unit Leadership    Deirdre Lewis was admitted on 6/7/2021  9:28 AM    Anticipated Discharge Date:  Expected Discharge Date: 06/09/21    Disposition:    Parveen Score:  Parveen Scale Score: 20    Readmission Risk              Risk of Unplanned Readmission:  0           Discussed patient goal for the day, patient clinical progression, and barriers to discharge.   The following Goal(s) of the Day/Commitment(s) have been identified:  Diagnostics - Report Results and Labs - Report Results      John Lamb RN  June 8, 2021

## 2021-06-08 NOTE — PROGRESS NOTES
Catheter care demonstrated to patient and his daughter, Ulysses Castellano. Teach back shown by patient for how to empty bag, change bag, and how to use leg strap.        Electronically signed by Amy Fortune RN on 6/8/2021 at 1:04 PM

## 2021-06-08 NOTE — DISCHARGE SUMMARY
Patient ID:  Jose Patel  [id number]  39 y.o.  2/4/1929    Admit date: 6/7/2021    Discharge date and time: 6/8/2021    Admitting Physician: Shefali Jasmine MD     Admission Diagnoses: BPH with urinary obstruction [N40.1, N13.8]    Discharge Diagnoses: same    Hospital Course: uneventful    Treatments: IV hydration, antibiotics: Ancef, analgesia: Percocet and surgery: TURP and Prostate Biopsy     Disposition: home    Condition: Stable     Patient Instructions:   Current Discharge Medication List      START taking these medications    Details   cephALEXin (KEFLEX) 250 MG capsule Take 1 capsule by mouth 3 times daily for 5 days  Qty: 15 capsule, Refills: 0      oxyCODONE-acetaminophen (PERCOCET) 5-325 MG per tablet Take 1 tablet by mouth every 6 hours as needed for Pain for up to 3 days. Intended supply: 3 days. Take lowest dose possible to manage pain  Qty: 12 tablet, Refills: 0    Comments: Reduce doses taken as pain becomes manageable  Associated Diagnoses: Post-op pain         CONTINUE these medications which have NOT CHANGED    Details   triamcinolone (KENALOG) 0.1 % cream Apply topically 2 times daily. Do not apply to face or skin folds, do not use >21 days  Qty: 1 Tube, Refills: 1      simvastatin (ZOCOR) 40 MG tablet Take 1 tablet by mouth nightly  Qty: 90 tablet, Refills: 1    Associated Diagnoses: Mixed hyperlipidemia; Primary generalized (osteo)arthritis      levothyroxine (SYNTHROID) 100 MCG tablet Take 1 tablet by mouth Daily  Qty: 90 tablet, Refills: 1    Associated Diagnoses: Acquired hypothyroidism      Multiple Vitamins-Minerals (THERAPEUTIC MULTIVITAMIN-MINERALS) tablet Take 1 tablet by mouth daily      aspirin 81 MG EC tablet Take 81 mg by mouth daily      mirtazapine (REMERON) 7.5 MG tablet Take 1 tablet by mouth nightly  Qty: 30 tablet, Refills: 5    Associated Diagnoses:  Moderate episode of recurrent major depressive disorder (HCC)         STOP taking these medications       tamsulosin

## 2021-06-09 LAB — URINE CULTURE, ROUTINE: NORMAL

## 2021-07-06 ENCOUNTER — PREP FOR PROCEDURE (OUTPATIENT)
Dept: UROLOGY | Age: 86
End: 2021-07-06

## 2021-07-06 NOTE — H&P
Sinan Jessica   : 1929, 80year old Male          --------------------------------------------------------------------------------     CC/HPI: PT REFERRED HERE FOR ELEVATED PSA     HX BPH W/OBSTR, ELEVATED PSA   PSA 12.76 20   PSA 7.69 20   PSA 2019   HAS NEVER HAD BX   + URINARY FREQUENCY   + WEAK STREAM   -STRAIN   OCC INTERMITTENCY   NOCTURIA 1-3X NIGHT   STARTED ON TAMSULOSIN APPROX 1 MONTH AGO   NO REAL CHANGE WITH FLOW   -DYSURIA   -GH   68-year-old male who lives independently and is quite capable of relatively normal activity   Patient has rather severe obstructive voiding symptoms including frequency or urgency or weak stream and straining to void intermittent flow and nocturia   Patient also has a PSA which has risen from 6 in 2019 to current level of 12.7   Patient is taking Flomax        ALLERGIES: None     MEDICATIONS: Aspirin   Levothyroxine Sodium   Simvastatin   Triamcinolone Acetonide   Vitamin D3       Notes: COVID VAC      PSH: None     NON- PSH: None      PMH: Benign prostatic hyperplasia with lower urinary tract symptoms - 2017  Elevated prostate specific antigen [PSA] - 2017  Chronic kidney disease, stage 3 (moderate)         PMH Notes: SINUS BRADYCARDIA   HEARING LOSS     NON- PMH: Anemia, unspecified  Hypothyroidism, unspecified  Pure hypercholesterolemia, unspecified       FAMILY HISTORY: None     SOCIAL HISTORY: Marital Status:   Preferred Language: English; Ethnicity: Not  Or ; Race: White  Current Smoking Status: Patient has never smoked. Tobacco Use Assessment Completed: Used Tobacco in last 30 days? Drinks 2 caffeinated drinks per day. Patient's occupation is/was [employer]. REVIEW OF SYSTEMS:     Constitutional:   Patient denies fever, chills, and weight loss. Eyes:   Patient denies wearing glasses. Ears, Nose, Mouth, Throat:   Patient denies hearing loss.    Cardiovascular:   Patient denies chest pains, swollen ankles, and irregular heartbeat. Respiratory:   Patient denies shortness of breath, wheezing, and chronic cough. Gastrointestinal:   Patient denies abdominal pain, nausea/vomiting, and change in bowels. Genitourinary:   Patient denies incontinence, painful urination, blood in urine, lobo catheter, and suprapubic catheter. Musculoskeletal:   Patient denies using cane, using walker, and using wheelchair. Integumentary/Skin:   Patient denies rash, persistent itching, and skin cancer history. Neurological:   Patient denies numbness, tingling, dizziness, and altered mental status. Hematologic/Lymphatic:   Patient denies history blood transfusion, swollen glands, and abnormal bleeding. VITAL SIGNS:     Weight 143 lb / 64.86 kg   Height 70 in / 177.8 cm   Temperature 97.6 F / 36.4 C   BMI 20.5 kg/m²   Notes: WEARING MASK      PHYSICAL EXAMINATION:     Anus and Perineum: No hemorrhoids. No anal stenosis. No rectal fissure, no anal fissure. No edema, no dimple, no perineal tenderness, no anal tenderness. Scrotum: No lesions. No edema. No cysts. No warts. Epididymides: Right: no spermatocele, no masses, no cysts, no tenderness, no induration, no enlargement. Left: no spermatocele, no masses, no cysts, no tenderness, no induration, no enlargement. Testes: No tenderness, no swelling, no enlargement left testes. No tenderness, no swelling, no enlargement right testes. Normal location left testes. Normal location right testes. No mass, no cyst, no varicocele, no hydrocele left testes. No mass, no cyst, no varicocele, no hydrocele right testes. Urethral Meatus: Normal size. No lesion, no wart, no discharge, no polyp. Normal location. Penis: Circumcised, no foreskin warts, no cracks. No dorsal peyronie's plaques, no left corporal peyronie's plaques, no right corporal peyronie's plaques, no scarring, no shaft warts. No balanitis, no meatal stenosis.     Prostate: Patient has a 50 g prostate which shows Patient: Clinical Summary   Billing Summary:  Created            Notes:   patient has severe obstructive voiding symptoms with an elevated PSA and a digital rectal exam that is compatible with carcinoma   Patient was advised to undergo cystoscopy TUR prostate and prostate biopsy   He appears to be in very reasonable general health for his age   We will schedule this for as soon as possible

## 2021-08-16 ENCOUNTER — OFFICE VISIT (OUTPATIENT)
Dept: FAMILY MEDICINE CLINIC | Age: 86
End: 2021-08-16
Payer: MEDICARE

## 2021-08-16 VITALS
RESPIRATION RATE: 16 BRPM | SYSTOLIC BLOOD PRESSURE: 122 MMHG | BODY MASS INDEX: 20.19 KG/M2 | OXYGEN SATURATION: 99 % | TEMPERATURE: 97.3 F | HEIGHT: 70 IN | DIASTOLIC BLOOD PRESSURE: 72 MMHG | HEART RATE: 74 BPM | WEIGHT: 141 LBS

## 2021-08-16 DIAGNOSIS — R21 RASH AND NONSPECIFIC SKIN ERUPTION: Primary | ICD-10-CM

## 2021-08-16 PROCEDURE — 1036F TOBACCO NON-USER: CPT | Performed by: PHYSICIAN ASSISTANT

## 2021-08-16 PROCEDURE — G8427 DOCREV CUR MEDS BY ELIG CLIN: HCPCS | Performed by: PHYSICIAN ASSISTANT

## 2021-08-16 PROCEDURE — 1123F ACP DISCUSS/DSCN MKR DOCD: CPT | Performed by: PHYSICIAN ASSISTANT

## 2021-08-16 PROCEDURE — 4040F PNEUMOC VAC/ADMIN/RCVD: CPT | Performed by: PHYSICIAN ASSISTANT

## 2021-08-16 PROCEDURE — 99203 OFFICE O/P NEW LOW 30 MIN: CPT | Performed by: PHYSICIAN ASSISTANT

## 2021-08-16 PROCEDURE — G8420 CALC BMI NORM PARAMETERS: HCPCS | Performed by: PHYSICIAN ASSISTANT

## 2021-08-16 PROCEDURE — 96372 THER/PROPH/DIAG INJ SC/IM: CPT | Performed by: PHYSICIAN ASSISTANT

## 2021-08-16 RX ORDER — PREDNISONE 10 MG/1
TABLET ORAL
Qty: 18 TABLET | Refills: 0 | Status: SHIPPED
Start: 2021-08-16 | End: 2021-11-05

## 2021-08-16 RX ORDER — AMMONIUM LACTATE 12 G/100G
LOTION TOPICAL
COMMUNITY
Start: 2021-07-20 | End: 2022-05-20 | Stop reason: ALTCHOICE

## 2021-08-16 RX ORDER — CEPHALEXIN 500 MG/1
500 CAPSULE ORAL 2 TIMES DAILY
Qty: 20 CAPSULE | Refills: 0 | Status: SHIPPED | OUTPATIENT
Start: 2021-08-16 | End: 2021-08-26

## 2021-08-16 RX ORDER — METHYLPREDNISOLONE ACETATE 40 MG/ML
40 INJECTION, SUSPENSION INTRA-ARTICULAR; INTRALESIONAL; INTRAMUSCULAR; SOFT TISSUE ONCE
Status: COMPLETED | OUTPATIENT
Start: 2021-08-16 | End: 2021-08-16

## 2021-08-16 RX ADMIN — METHYLPREDNISOLONE ACETATE 40 MG: 40 INJECTION, SUSPENSION INTRA-ARTICULAR; INTRALESIONAL; INTRAMUSCULAR; SOFT TISSUE at 11:28

## 2021-08-16 NOTE — PROGRESS NOTES
21  Rudy Collet : 1929 Sex: male  Age 80 y.o. Subjective:  Chief Complaint   Patient presents with    Rash     on arms for months          HPI:   Rudy Collet , 80 y.o. male presents to express care for evaluation of rash    HPI  49-year-old male with a history of anemia, arthritis, CKD, dysphagia, enlarged prostate, hyperlipidemia, thyroid disease presents to express care for evaluation of rash to the upper extremities. The patient has noted this rash to the bilateral upper extremities ongoing for a couple months. The patient has gone to dermatology couple months ago and was sold to bar soap that does not seem to be helping. Really it was never treated with anything. He has been given some Kenalog cream at some point. The patient has been trying to use this but it has not been helping. The patient does have multiple excoriations noted throughout. Patient is not any chest pain, shortness of breath, lip or tongue swelling. ROS:   Unless otherwise stated in this report the patient's positive and negative responses for review of systems for constitutional, eyes, ENT, cardiovascular, respiratory, gastrointestinal, neurological, , musculoskeletal, and integument systems and related systems to the presenting problem are either stated in the history of present illness or were not pertinent or were negative for the symptoms and/or complaints related to the presenting medical problem. Positives and pertinent negatives as per HPI. All others reviewed and are negative.       PMH:     Past Medical History:   Diagnosis Date    Anemia     Arthritis     generalized    CKD (chronic kidney disease)     Dysphagia     has esophageal dilitations every 6-8 monhts    Enlarged prostate     BPH, dysuria    Hyperlipidemia     Lower esophageal ring (Schatzki) 2019    Mixed hyperlipidemia 2019    Thyroid disease        Past Surgical History:   Procedure Laterality Date    CATARACT REMOVAL WITH IMPLANT Bilateral     ESOPHAGUS SURGERY      x 3    PROSTATE BIOPSY N/A 6/7/2021    PROSTATE BIOPSY performed by Javi Islas MD at [address] TURP N/A 6/7/2021    CYSTOSCOPY RETROGRADE PYELOGRAMS TRANSURETHRAL RESECTION PROSTATE performed by Javi Islas MD at [address] N/A 7/7/2020    EGD WITH ESOPHAGEAL  DILATATION performed by Lopez Tyler MD at [address] N/A 5/4/2021    EGD ESOPHAGOGASTRODUODENOSCOPY WITH DILATION performed by Lopez Tyler MD at [address]       History reviewed. No pertinent family history. Medications:     Current Outpatient Medications:     ammonium lactate (LAC-HYDRIN) 12 % lotion, apply to affected area twice a day, Disp: , Rfl:     predniSONE (DELTASONE) 10 MG tablet, 3 tabs once daily for 3 days, 2 tabs once daily for 3 days, 1 tab once daily for 3 days, Disp: 18 tablet, Rfl: 0    cephALEXin (KEFLEX) 500 MG capsule, Take 1 capsule by mouth 2 times daily for 10 days, Disp: 20 capsule, Rfl: 0    triamcinolone (KENALOG) 0.1 % cream, Apply topically 2 times daily. Do not apply to face or skin folds, do not use >21 days, Disp: 1 Tube, Rfl: 1    simvastatin (ZOCOR) 40 MG tablet, Take 1 tablet by mouth nightly, Disp: 90 tablet, Rfl: 1    levothyroxine (SYNTHROID) 100 MCG tablet, Take 1 tablet by mouth Daily, Disp: 90 tablet, Rfl: 1    Multiple Vitamins-Minerals (THERAPEUTIC MULTIVITAMIN-MINERALS) tablet, Take 1 tablet by mouth daily, Disp: , Rfl:     Allergies:   No Known Allergies    Social History:     Social History     Tobacco Use    Smoking status: Never Smoker    Smokeless tobacco: Never Used   Vaping Use    Vaping Use: Never used   Substance Use Topics    Alcohol use: Not Currently    Drug use: Never       Patient lives at home.     Physical Exam:     Vitals:    08/16/21 1114   BP: 122/72   Pulse: 74   Resp: 16   Temp: 97.3 °F (36.3 °C)   SpO2: 99%   Weight: 141 lb (64 kg)   Height: 5' 10\" (1.778 m)       Exam:  Physical Exam  Nurses note and vital signs reviewed and patient is not hypoxic. General: The patient appears well and in no apparent distress. Patient is resting comfortably on cart. Skin: Warm, dry, no pallor noted. The bilateral upper extremities show evidence of a maculopapular rash diffusely throughout the bilateral arms, seems to spare the face, upper torso just seems to be localized to the arm. The patient has multiple excoriations noted. There are a few areas that do have some concern for cellulitis. There is no lymphangitic streaking. The patient has no vesicles. No petechia or purpura. Head: Normocephalic, atraumatic. Eye: Normal conjunctiva  Ears, Nose, Mouth, and Throat: Oral mucosa is moist  Cardiovascular: Regular Rate and Rhythm  Respiratory: Patient is in no distress, no accessory muscle use, lungs are clear to auscultation, no wheezing, crackles or rhonchi  Back: Non-tender, no CVA tenderness bilaterally to percussion. GI: Normal bowel sounds, no tenderness to palpation, no masses appreciated. No rebound, guarding, or rigidity noted. Musculoskeletal: The patient has no evidence of calf tenderness, no pitting edema, symmetrical pulses noted bilaterally  Neurological: A&O x4, normal speech        Testing:           Medical Decision Making:     Vital signs reviewed    Past medical history reviewed. Allergies reviewed. Medications reviewed. Patient on arrival does not appear to be in any apparent distress or discomfort. The patient has been seen and evaluated. The patient does not appear to be toxic or lethargic. The patient will be treated with intramuscular injection methylprednisone. We will start the patient on tapering dose of prednisone and cephalexin. The patient will follow up with PCP in 2 to 3 days for mandatory recheck and repeat evaluation.   Patient would like to had not go back to dermatology. The patient is to return to express care or go directly to the emergency department should any of the signs or symptoms worsen. The patient is to followup with primary care physician in 2-3 days for repeat evaluation. The patient has no other questions or concerns at this time the patient will be discharged home. Clinical Impression:   Desi Jessica was seen today for rash. Diagnoses and all orders for this visit:    Rash and nonspecific skin eruption    Other orders  -     methylPREDNISolone acetate (DEPO-MEDROL) injection 40 mg  -     predniSONE (DELTASONE) 10 MG tablet; 3 tabs once daily for 3 days, 2 tabs once daily for 3 days, 1 tab once daily for 3 days  -     cephALEXin (KEFLEX) 500 MG capsule; Take 1 capsule by mouth 2 times daily for 10 days        The patient is to call for any concerns or return if any of the signs or symptoms worsen. The patient is to follow-up with PCP in the next 2-3 days for repeat evaluation repeat assessment or go directly to the emergency department.      SIGNATURE: Ximena uHitron III, PA-C

## 2021-08-27 ENCOUNTER — OFFICE VISIT (OUTPATIENT)
Dept: FAMILY MEDICINE CLINIC | Age: 86
End: 2021-08-27
Payer: MEDICARE

## 2021-08-27 VITALS
TEMPERATURE: 97.8 F | DIASTOLIC BLOOD PRESSURE: 82 MMHG | HEART RATE: 67 BPM | BODY MASS INDEX: 20.23 KG/M2 | WEIGHT: 141 LBS | SYSTOLIC BLOOD PRESSURE: 140 MMHG | OXYGEN SATURATION: 98 %

## 2021-08-27 DIAGNOSIS — R21 RASH: Primary | ICD-10-CM

## 2021-08-27 PROCEDURE — 99213 OFFICE O/P EST LOW 20 MIN: CPT | Performed by: NURSE PRACTITIONER

## 2021-08-27 PROCEDURE — 1123F ACP DISCUSS/DSCN MKR DOCD: CPT | Performed by: NURSE PRACTITIONER

## 2021-08-27 PROCEDURE — 1036F TOBACCO NON-USER: CPT | Performed by: NURSE PRACTITIONER

## 2021-08-27 PROCEDURE — 4040F PNEUMOC VAC/ADMIN/RCVD: CPT | Performed by: NURSE PRACTITIONER

## 2021-08-27 PROCEDURE — G8420 CALC BMI NORM PARAMETERS: HCPCS | Performed by: NURSE PRACTITIONER

## 2021-08-27 PROCEDURE — G8427 DOCREV CUR MEDS BY ELIG CLIN: HCPCS | Performed by: NURSE PRACTITIONER

## 2021-08-27 RX ORDER — CETIRIZINE HYDROCHLORIDE 10 MG/1
10 TABLET ORAL DAILY
Qty: 30 TABLET | Refills: 0 | Status: SHIPPED
Start: 2021-08-27 | End: 2022-05-20 | Stop reason: ALTCHOICE

## 2021-08-27 NOTE — PATIENT INSTRUCTIONS
Patient Education        Rash: Care Instructions  Your Care Instructions  A rash is any irritation or inflammation of the skin. Rashes have many possible causes, including allergy, infection, illness, heat, and emotional stress. Follow-up care is a key part of your treatment and safety. Be sure to make and go to all appointments, and call your doctor if you are having problems. It's also a good idea to know your test results and keep a list of the medicines you take. How can you care for yourself at home? · Wash the area with water only. Soap can make dryness and itching worse. Pat dry. · Put cold, wet cloths on the rash to reduce itching. · Keep cool, and stay out of the sun. · Leave the rash open to the air as much of the time as possible. · Sometimes petroleum jelly (Vaseline) can help relieve the discomfort caused by a rash. A moisturizing lotion, such as Cetaphil, also may help. Calamine lotion may help for rashes caused by contact with something (such as a plant or soap) that irritated the skin. Use it 3 or 4 times a day. · If your doctor prescribed a cream, use it as directed. If your doctor prescribed medicine, take it exactly as directed. · If your rash itches so badly that it interferes with your normal activities, take an over-the-counter antihistamine, such as diphenhydramine (Benadryl) or loratadine (Claritin). Read and follow all instructions on the label. When should you call for help? Call your doctor now or seek immediate medical care if:    · You have signs of infection, such as:  ? Increased pain, swelling, warmth, or redness. ? Red streaks leading from the area. ? Pus draining from the area. ? A fever.     · You have joint pain along with the rash. Watch closely for changes in your health, and be sure to contact your doctor if:    · Your rash is changing or getting worse. For example, call if you have pain along with the rash, the rash is spreading, or you have new blisters.   · You do not get better after 1 week. Where can you learn more? Go to [email or web address] and sign in to your [employer] account. Enter I973 in the [employer] box to learn more about \"Rash: Care Instructions. \"     If you do not have an account, please click on the \"Sign Up Now\" link. Current as of: March 3, 2021               Content Version: 12.9  © 2006-2021 [employer]. Care instructions adapted under license by [facility] [address] If you have questions about a medical condition or this instruction, always ask your healthcare professional. [address] any warranty or liability for your use of this information.

## 2021-08-27 NOTE — PROGRESS NOTES
21  Issa Boehringer : 1929 Sex: male  Age 80 y.o. Subjective:  Chief Complaint   Patient presents with    Rash     recheck. On both arms        HPI:   Issa Bennett , 80 y.o. male presents to the clinic for evaluation of rash x 3 months. The patient reports associated pruritus. The patient was seen on 21 for symptoms. The patient has taken steroids and keflex for symptoms. The patient also reports being seen by Dr. Lorelei Godwin dermatologist for symptoms. The patient reports unchanged symptoms over time. Denies any known cause for the rash including any new soaps, detergents, lotions, foods, or medications. Denies any bleeding, drainage, lymphangitic streaking, recent illness, arthralgia, myalgia,or lethargy. The patient also denies headache, fever, chest pain, abdominal pain, shortness of breath, and nausea / vomiting / diarrhea. ROS:   Unless otherwise stated in this report the patient's positive and negative responses for review of systems for constitutional, eyes, ENT, cardiovascular, respiratory, gastrointestinal, neurological, , musculoskeletal, and integument systems and related systems to the presenting problem are either stated in the history of present illness or were not pertinent or were negative for the symptoms and/or complaints related to the presenting medical problem. Positives and pertinent negatives as per HPI. All others reviewed and are negative.       PMH:     Past Medical History:   Diagnosis Date    Anemia     Arthritis     generalized    CKD (chronic kidney disease)     Dysphagia     has esophageal dilitations every 6-8 monhts    Enlarged prostate     BPH, dysuria    Hyperlipidemia     Lower esophageal ring (Schatzki) 2019    Mixed hyperlipidemia 2019    Thyroid disease        Past Surgical History:   Procedure Laterality Date    CATARACT REMOVAL WITH IMPLANT Bilateral     ESOPHAGUS SURGERY      x 3    PROSTATE BIOPSY N/A 2021 PROSTATE BIOPSY performed by Ruby Carrington MD at [address] [facility] [address] TURP N/A 6/7/2021    CYSTOSCOPY RETROGRADE PYELOGRAMS TRANSURETHRAL RESECTION PROSTATE performed by Ruby Carrington MD at [address] N/A 7/7/2020    EGD WITH ESOPHAGEAL  DILATATION performed by Hi De La Paz MD at [facility] N/A 5/4/2021    EGD ESOPHAGOGASTRODUODENOSCOPY WITH DILATION performed by Hi De La Paz MD at [address]       History reviewed. No pertinent family history. Medications:     Current Outpatient Medications:     CEPHALEXIN PO, Take by mouth, Disp: , Rfl:     cetirizine (ZYRTEC ALLERGY) 10 MG tablet, Take 1 tablet by mouth daily, Disp: 30 tablet, Rfl: 0    ammonium lactate (LAC-HYDRIN) 12 % lotion, apply to affected area twice a day, Disp: , Rfl:     predniSONE (DELTASONE) 10 MG tablet, 3 tabs once daily for 3 days, 2 tabs once daily for 3 days, 1 tab once daily for 3 days, Disp: 18 tablet, Rfl: 0    triamcinolone (KENALOG) 0.1 % cream, Apply topically 2 times daily. Do not apply to face or skin folds, do not use >21 days, Disp: 1 Tube, Rfl: 1    simvastatin (ZOCOR) 40 MG tablet, Take 1 tablet by mouth nightly, Disp: 90 tablet, Rfl: 1    levothyroxine (SYNTHROID) 100 MCG tablet, Take 1 tablet by mouth Daily, Disp: 90 tablet, Rfl: 1    Multiple Vitamins-Minerals (THERAPEUTIC MULTIVITAMIN-MINERALS) tablet, Take 1 tablet by mouth daily, Disp: , Rfl:     Allergies:   No Known Allergies    Social History:     Social History     Tobacco Use    Smoking status: Never Smoker    Smokeless tobacco: Never Used   Vaping Use    Vaping Use: Never used   Substance Use Topics    Alcohol use: Not Currently    Drug use: Never       Patient lives at home.     Physical Exam:     Vitals:    08/27/21 1024   BP: (!) 140/82   Pulse: 67   Temp: 97.8 °F (36.6 °C)   SpO2: 98%   Weight: 141 lb (64 kg)       Physical Exam (PE) Constitutional: Alert, development consistent with age. HENT:      Head: Normocephalic. Right Ear: External ear normal.      Left Ear: External ear normal.      Nose: Normal.      Mouth/Throat:     Mouth: Mucous membranes are moist.      Pharynx: Oropharynx is clear. Eyes: Pupils: Pupils are equal, round, and reactive to light. Neck: Normal ROM. Supple. Cardiovascular: Heart RRR without pathologic murmurs or gallops. Pulmonary: Respiratory effort normal.  Normal breath sounds. Abdomen: Soft, nontender, normal bowel sounds. Skin:  Normal turgor and appropriately dry to touch. Dry, erythematous, macular papular rash noted over the upper extremities. Signs of excoriation noted but no signs of secondary infection including TTP,   pustules, induration, or fluctuance. No bleeding or discharge noted. No lymphangitic streaking. Neurological:  Orientation age-appropriate unless noted elsewhere. Motor functions intact. Psychiatric: Mood and Affect: Mood normal. Behavior: Behavior normal    Testing:   (All laboratory and radiology results have been personally reviewed by myself)  Labs:  No results found for this visit on 08/27/21. Imaging: All Radiology results interpreted by Radiologist unless otherwise noted. No orders to display       Assessment / Plan:   The patient's vitals, allergies, medications, and past medical history have been reviewed. Donell Samayoa was seen today for rash. Diagnoses and all orders for this visit:    Rash  -     Elmer Glass DO, [facility] ([facility])  -     cetirizine (ZYRTEC ALLERGY) 10 MG tablet; Take 1 tablet by mouth daily        - Disposition: Home    - Educational material printed for patient's review and were included in patient instructions. After Visit Summary and given to patient at the end of visit. - Discussed symptomatic treatments with patient today. The patient is advised to avoid scratching to prevent the development of a secondary infection.  F/u

## 2021-08-31 ENCOUNTER — TELEPHONE (OUTPATIENT)
Dept: PRIMARY CARE CLINIC | Age: 86
End: 2021-08-31

## 2021-08-31 NOTE — TELEPHONE ENCOUNTER
----- Message from [facility] sent at 8/30/2021  3:44 PM EDT -----  Subject: Message to Provider    QUESTIONS  Information for Provider? Marty Gonzales, called on 08/30 in regards to her father   and patient. About 2 weeks ago, pt took himself upon himself to go [facility], the provider that saw him gave him some kind of shot, 2   medications and sent him on his way. Marty Gonzales would like to know why for skin   problems. Marty Gonzales would really like to speak with someone at the office in   regards to this issue and hoe to hear something by noon tomorrow! Can   reach Marty Gonzales at the below number? [phone].   ---------------------------------------------------------------------------  --------------  [place]  What is the best way for the office to contact you? Do not leave any   message, patient will call back for answer  Preferred Call Back Phone Number? [phone]  ---------------------------------------------------------------------------  --------------  SCRIPT ANSWERS  Relationship to Patient? Other  Representative Name? Fanny Silverman  Is the Representative on the appropriate HIPAA document in Epic?  Yes

## 2021-10-30 ENCOUNTER — HOSPITAL ENCOUNTER (OUTPATIENT)
Age: 86
Discharge: HOME | End: 2021-10-30
Payer: MEDICARE

## 2021-10-30 DIAGNOSIS — R97.20 ELEVATED PSA: ICD-10-CM

## 2021-10-30 DIAGNOSIS — E55.9 VITAMIN D DEFICIENCY: ICD-10-CM

## 2021-10-30 DIAGNOSIS — E78.2 MIXED HYPERLIPIDEMIA: ICD-10-CM

## 2021-10-30 DIAGNOSIS — D63.8 ANEMIA, CHRONIC DISEASE: ICD-10-CM

## 2021-10-30 DIAGNOSIS — R53.83 OTHER FATIGUE: ICD-10-CM

## 2021-10-30 LAB
ALBUMIN SERPL-MCNC: 4 G/DL (ref 3.5–5.2)
ALP BLD-CCNC: 61 U/L (ref 40–129)
ALT SERPL-CCNC: 10 U/L (ref 0–40)
ANION GAP SERPL CALCULATED.3IONS-SCNC: 11 MMOL/L (ref 7–16)
AST SERPL-CCNC: 21 U/L (ref 0–39)
BACTERIA: ABNORMAL /HPF
BILIRUB SERPL-MCNC: 0.3 MG/DL (ref 0–1.2)
BILIRUBIN URINE: NEGATIVE
BLOOD, URINE: ABNORMAL
BUN BLDV-MCNC: 19 MG/DL (ref 6–23)
CALCIUM SERPL-MCNC: 9.5 MG/DL (ref 8.6–10.2)
CHLORIDE BLD-SCNC: 109 MMOL/L (ref 98–107)
CHOLESTEROL, FASTING: 130 MG/DL (ref 0–199)
CLARITY: CLEAR
CO2: 24 MMOL/L (ref 22–29)
COLOR: YELLOW
CREAT SERPL-MCNC: 1.4 MG/DL (ref 0.7–1.2)
FERRITIN: 22 NG/ML
FOLATE: >20 NG/ML (ref 4.8–24.2)
GFR AFRICAN AMERICAN: 57
GFR NON-AFRICAN AMERICAN: 47 ML/MIN/1.73
GLUCOSE BLD-MCNC: 90 MG/DL (ref 74–99)
GLUCOSE URINE: NEGATIVE MG/DL
HDLC SERPL-MCNC: 45 MG/DL
IRON SATURATION: 12 % (ref 20–55)
IRON: 42 MCG/DL (ref 59–158)
KETONES, URINE: NEGATIVE MG/DL
LDL CHOLESTEROL CALCULATED: 70 MG/DL (ref 0–99)
LEUKOCYTE ESTERASE, URINE: ABNORMAL
MAGNESIUM: 2.2 MG/DL (ref 1.6–2.6)
NITRITE, URINE: NEGATIVE
PH UA: 6 (ref 5–9)
POTASSIUM SERPL-SCNC: 4 MMOL/L (ref 3.5–5)
PROTEIN UA: NEGATIVE MG/DL
RBC UA: ABNORMAL /HPF (ref 0–2)
SODIUM BLD-SCNC: 144 MMOL/L (ref 132–146)
SPECIFIC GRAVITY UA: 1.02 (ref 1–1.03)
TOTAL IRON BINDING CAPACITY: 337 MCG/DL (ref 250–450)
TOTAL PROTEIN: 7.2 G/DL (ref 6.4–8.3)
TRIGLYCERIDE, FASTING: 74 MG/DL (ref 0–149)
TSH SERPL DL<=0.05 MIU/L-ACNC: 0.17 UIU/ML (ref 0.27–4.2)
UROBILINOGEN, URINE: 0.2 E.U./DL
VITAMIN B-12: 1085 PG/ML (ref 211–946)
VITAMIN D 25-HYDROXY: 26 NG/ML (ref 30–100)
VLDLC SERPL CALC-MCNC: 15 MG/DL
WBC UA: ABNORMAL /HPF (ref 0–5)

## 2021-10-30 PROCEDURE — 83540 ASSAY OF IRON: CPT

## 2021-10-30 PROCEDURE — 83550 IRON BINDING TEST: CPT

## 2021-10-30 PROCEDURE — 80061 LIPID PANEL: CPT

## 2021-10-30 PROCEDURE — 82728 ASSAY OF FERRITIN: CPT

## 2021-10-30 PROCEDURE — 82607 VITAMIN B-12: CPT

## 2021-10-30 PROCEDURE — 84443 ASSAY THYROID STIM HORMONE: CPT

## 2021-10-30 PROCEDURE — 83735 ASSAY OF MAGNESIUM: CPT

## 2021-10-30 PROCEDURE — 36415 COLL VENOUS BLD VENIPUNCTURE: CPT

## 2021-10-30 PROCEDURE — 82746 ASSAY OF FOLIC ACID SERUM: CPT

## 2021-10-30 PROCEDURE — 80053 COMPREHEN METABOLIC PANEL: CPT

## 2021-10-30 PROCEDURE — 81001 URINALYSIS AUTO W/SCOPE: CPT

## 2021-10-30 PROCEDURE — 82306 VITAMIN D 25 HYDROXY: CPT

## 2021-10-31 ENCOUNTER — TELEPHONE (OUTPATIENT)
Dept: FAMILY MEDICINE CLINIC | Age: 86
End: 2021-10-31

## 2021-10-31 NOTE — TELEPHONE ENCOUNTER
Is let the patient know that blood work results showed    Thyroid levels were considered overactive.   Would need to consider adjustment in thyroid dose    Labs showed slight kidney dysfunction but similar when compared to previous    Other electrolytes and liver functions were normal    Cholesterol levels were fair and would recommend to continue present medications and continue to watch diet    Vitamin D level was low and would consider vitamin D supplement    Iron levels were also low    Storage form of iron ferritin was normal    Vitamin B39 and folic acid levels were normal    Urine analysis showed white cells and bacteria but otherwise was normal    Thanks

## 2021-11-05 ENCOUNTER — OFFICE VISIT (OUTPATIENT)
Dept: PRIMARY CARE CLINIC | Age: 86
End: 2021-11-05
Payer: MEDICARE

## 2021-11-05 VITALS
SYSTOLIC BLOOD PRESSURE: 132 MMHG | OXYGEN SATURATION: 98 % | HEART RATE: 72 BPM | WEIGHT: 147.41 LBS | BODY MASS INDEX: 21.15 KG/M2 | TEMPERATURE: 97 F | DIASTOLIC BLOOD PRESSURE: 78 MMHG

## 2021-11-05 DIAGNOSIS — E03.9 ACQUIRED HYPOTHYROIDISM: ICD-10-CM

## 2021-11-05 DIAGNOSIS — D63.8 ANEMIA, CHRONIC DISEASE: ICD-10-CM

## 2021-11-05 DIAGNOSIS — Z23 NEED FOR INFLUENZA VACCINATION: Primary | ICD-10-CM

## 2021-11-05 DIAGNOSIS — F33.1 MODERATE EPISODE OF RECURRENT MAJOR DEPRESSIVE DISORDER (HCC): ICD-10-CM

## 2021-11-05 DIAGNOSIS — E55.9 VITAMIN D DEFICIENCY: ICD-10-CM

## 2021-11-05 DIAGNOSIS — K22.2 LOWER ESOPHAGEAL RING (SCHATZKI): ICD-10-CM

## 2021-11-05 DIAGNOSIS — H91.93 BILATERAL HEARING LOSS, UNSPECIFIED HEARING LOSS TYPE: ICD-10-CM

## 2021-11-05 DIAGNOSIS — R97.20 ELEVATED PSA: ICD-10-CM

## 2021-11-05 DIAGNOSIS — N18.30 STAGE 3 CHRONIC KIDNEY DISEASE, UNSPECIFIED WHETHER STAGE 3A OR 3B CKD (HCC): ICD-10-CM

## 2021-11-05 DIAGNOSIS — E78.2 MIXED HYPERLIPIDEMIA: ICD-10-CM

## 2021-11-05 DIAGNOSIS — M15.0 PRIMARY GENERALIZED (OSTEO)ARTHRITIS: ICD-10-CM

## 2021-11-05 DIAGNOSIS — R21 RASH AND NONSPECIFIC SKIN ERUPTION: ICD-10-CM

## 2021-11-05 PROCEDURE — 99214 OFFICE O/P EST MOD 30 MIN: CPT | Performed by: INTERNAL MEDICINE

## 2021-11-05 PROCEDURE — 90694 VACC AIIV4 NO PRSRV 0.5ML IM: CPT | Performed by: INTERNAL MEDICINE

## 2021-11-05 PROCEDURE — 4040F PNEUMOC VAC/ADMIN/RCVD: CPT | Performed by: INTERNAL MEDICINE

## 2021-11-05 PROCEDURE — G0008 ADMIN INFLUENZA VIRUS VAC: HCPCS | Performed by: INTERNAL MEDICINE

## 2021-11-05 PROCEDURE — 1123F ACP DISCUSS/DSCN MKR DOCD: CPT | Performed by: INTERNAL MEDICINE

## 2021-11-05 PROCEDURE — G8484 FLU IMMUNIZE NO ADMIN: HCPCS | Performed by: INTERNAL MEDICINE

## 2021-11-05 PROCEDURE — G8427 DOCREV CUR MEDS BY ELIG CLIN: HCPCS | Performed by: INTERNAL MEDICINE

## 2021-11-05 PROCEDURE — 1036F TOBACCO NON-USER: CPT | Performed by: INTERNAL MEDICINE

## 2021-11-05 PROCEDURE — G8420 CALC BMI NORM PARAMETERS: HCPCS | Performed by: INTERNAL MEDICINE

## 2021-11-05 RX ORDER — TRIAMCINOLONE ACETONIDE 1 MG/G
CREAM TOPICAL
Qty: 1 EACH | Refills: 1 | Status: SHIPPED
Start: 2021-11-05 | End: 2022-05-20 | Stop reason: ALTCHOICE

## 2021-11-05 RX ORDER — LEVOTHYROXINE SODIUM 88 UG/1
88 TABLET ORAL DAILY
Qty: 90 TABLET | Refills: 1 | Status: SHIPPED
Start: 2021-11-05 | End: 2022-05-20 | Stop reason: SDUPTHER

## 2021-11-05 RX ORDER — MIRTAZAPINE 7.5 MG/1
7.5 TABLET, FILM COATED ORAL NIGHTLY
Qty: 30 TABLET | Refills: 5 | Status: SHIPPED
Start: 2021-11-05 | End: 2022-05-20 | Stop reason: ALTCHOICE

## 2021-11-05 RX ORDER — FERROUS SULFATE 325(65) MG
325 TABLET ORAL
Qty: 30 TABLET | Refills: 5 | Status: SHIPPED
Start: 2021-11-05 | End: 2022-05-20 | Stop reason: ALTCHOICE

## 2021-11-05 RX ORDER — KETOCONAZOLE 20 MG/ML
SHAMPOO TOPICAL
Qty: 100 ML | Refills: 0 | Status: SHIPPED
Start: 2021-11-05 | End: 2022-05-20 | Stop reason: ALTCHOICE

## 2021-11-05 SDOH — ECONOMIC STABILITY: FOOD INSECURITY: WITHIN THE PAST 12 MONTHS, YOU WORRIED THAT YOUR FOOD WOULD RUN OUT BEFORE YOU GOT MONEY TO BUY MORE.: NEVER TRUE

## 2021-11-05 SDOH — ECONOMIC STABILITY: FOOD INSECURITY: WITHIN THE PAST 12 MONTHS, THE FOOD YOU BOUGHT JUST DIDN'T LAST AND YOU DIDN'T HAVE MONEY TO GET MORE.: NEVER TRUE

## 2021-11-05 ASSESSMENT — ENCOUNTER SYMPTOMS
SORE THROAT: 0
RHINORRHEA: 1
CONSTIPATION: 0
BLOOD IN STOOL: 0
CHEST TIGHTNESS: 0
DIARRHEA: 0
SHORTNESS OF BREATH: 0
VOMITING: 0
EYE PAIN: 0
ABDOMINAL PAIN: 0
NAUSEA: 0
COUGH: 0

## 2021-11-05 ASSESSMENT — SOCIAL DETERMINANTS OF HEALTH (SDOH): HOW HARD IS IT FOR YOU TO PAY FOR THE VERY BASICS LIKE FOOD, HOUSING, MEDICAL CARE, AND HEATING?: NOT HARD AT ALL

## 2021-11-05 NOTE — PROGRESS NOTES
[facility]) Physicians   Internal Medicine     2021  Michael aHrt : 1929 Sex: male  Age:89 y.o. Chief Complaint   Patient presents with    Hyperlipidemia    Depression    Hypothyroidism        HPI:   Patient presents to office for evaluation of the following  medical concerns. -  has been dealing with rash. States pruritic.  has been to 2 different dermatologist and 2 visits to urgent care.  has been using triamcinolone cream and lac hydrin. States was given zyretec and prednisone. Also treated with cephalexin. - Depression and anxiety is unchanged. screening was positive on office visit (2020). States symptoms of lonely and having no one to talk to causes depression. No current suicidal plans. Self stopped zoloft. States seems to be helping, No reported side effects. Was to start remeron. States has esophageal stricture and ring. Follows with GI. States has had esophageal dilation. Not taking omeprazole. Had EGD () 1. Schatzki's ring s/p dilation, will need periodic dilation. Symptoms improved. Last visit with GI per reviewed note  () - dysphagia, poss symptomatic ring poss EGD. States has high cholesterol. Watching diet. On Simvastatin. No reported side effects. States has hypothyroidism. On levothyroxine. Last lab (10/2021) slight over active,    States has arthritis. Has pain to shoulder an ankle. Symptoms are intermittent. Stable. States has chronic kidney disease. Not currently following with nephrology. Discussed medications to avoid. Last lab (10/2021)  stable. States has anemia. Has had colonoscopy. Following labs - last labs (2020) improved. States stopped taking iron, recommend to take iron 3x per week      States has enlarged prostate. PSA was elevated at 7.69. States having urinary frequency.  () - rec cystoscopy, YUR prostate and prostate bx opCystopanendoscopy, retrograde pyelogram, transurethral resection of Take 1 tablet by mouth Daily, Disp: 90 tablet, Rfl: 1    ferrous sulfate (IRON 325) 325 (65 Fe) MG tablet, Take 1 tablet by mouth three times a week, Disp: 30 tablet, Rfl: 5    mirtazapine (REMERON) 7.5 MG tablet, Take 1 tablet by mouth nightly, Disp: 30 tablet, Rfl: 5    CEPHALEXIN PO, Take by mouth, Disp: , Rfl:     cetirizine (ZYRTEC ALLERGY) 10 MG tablet, Take 1 tablet by mouth daily, Disp: 30 tablet, Rfl: 0    ammonium lactate (LAC-HYDRIN) 12 % lotion, apply to affected area twice a day, Disp: , Rfl:     simvastatin (ZOCOR) 40 MG tablet, Take 1 tablet by mouth nightly, Disp: 90 tablet, Rfl: 1    Multiple Vitamins-Minerals (THERAPEUTIC MULTIVITAMIN-MINERALS) tablet, Take 1 tablet by mouth daily, Disp: , Rfl:     No Known Allergies    Past Medical History:   Diagnosis Date    Anemia     Arthritis     generalized    CKD (chronic kidney disease)     Dysphagia     has esophageal dilitations every 6-8 monhts    Enlarged prostate     BPH, dysuria    Hyperlipidemia     Lower esophageal ring (Schatzki) 05/02/2019    Mixed hyperlipidemia 5/2/2019    Thyroid disease        Past Surgical History:   Procedure Laterality Date    CATARACT REMOVAL WITH IMPLANT Bilateral     ESOPHAGUS SURGERY      x 3    PROSTATE BIOPSY N/A 6/7/2021    PROSTATE BIOPSY performed by Maxx Lewis MD at [address] [place] [address] TURP N/A 6/7/2021    CYSTOSCOPY RETROGRADE PYELOGRAMS TRANSURETHRAL RESECTION PROSTATE performed by Maxx Lewis MD at [address] [facility] N/A 7/7/2020    EGD WITH ESOPHAGEAL  DILATATION performed by Sophia Chadwick MD at [address] [facility] N/A 5/4/2021    EGD ESOPHAGOGASTRODUODENOSCOPY WITH DILATION performed by Sophia Chadwick MD at [facility] ENDOSCOPY       No family history on file. Social History     Socioeconomic History    Marital status:       Spouse name: Not on file    Number of children: Not on file    Years of education: Not on file    Highest education level: Not on file   Occupational History    Not on file   Tobacco Use    Smoking status: Never Smoker    Smokeless tobacco: Never Used   Vaping Use    Vaping Use: Never used   Substance and Sexual Activity    Alcohol use: Not Currently    Drug use: Never    Sexual activity: Not on file   Other Topics Concern    Not on file   Social History Narrative    Not on file     Social Determinants of Health     Financial Resource Strain: Low Risk     Difficulty of Paying Living Expenses: Not hard at all   Food Insecurity: No Food Insecurity    Worried About [address] in the Last Year: Never true    [address] [facility] [employer] in the Last Year: Never true   Transportation Needs:     Lack of Transportation (Medical):  Lack of Transportation (Non-Medical):    Physical Activity:     Days of Exercise per Week:     Minutes of Exercise per Session:    Stress:     Feeling of Stress :    Social Connections:     Frequency of Communication with Friends and Family:     Frequency of Social Gatherings with Friends and Family:     Attends [employer] Services:     Active Member of Clubs or Organizations:     Attends Club or Organization Meetings:     Marital Status:    Intimate Partner Violence:     Fear of Current or Ex-Partner:     Emotionally Abused:     Physically Abused:     Sexually Abused:        Vitals:    11/05/21 0959   BP: 132/78   Site: Right Upper Arm   Position: Sitting   Pulse: 72   Temp: 97 °F (36.1 °C)   TempSrc: Temporal   SpO2: 98%   Weight: 147 lb 6.6 oz (66.9 kg)       Exam:  Physical Exam  Constitutional:       Appearance: He is well-developed. HENT:      Head: Normocephalic and atraumatic. Right Ear: External ear normal.      Left Ear: External ear normal.   Eyes:      Pupils: Pupils are equal, round, and reactive to light. Neck:      Thyroid: No thyromegaly. Cardiovascular:      Rate and Rhythm: Normal rate and regular rhythm. Heart sounds: Normal heart sounds. No murmur heard. Pulmonary:      Effort: Pulmonary effort is normal.      Breath sounds: Normal breath sounds. No wheezing or rales. Abdominal:      General: Bowel sounds are normal.      Palpations: Abdomen is soft. Tenderness: There is no abdominal tenderness. There is no guarding or rebound. Musculoskeletal:         General: Normal range of motion. Cervical back: Normal range of motion and neck supple. Lymphadenopathy:      Cervical: No cervical adenopathy. Skin:     General: Skin is warm and dry. Neurological:      Mental Status: He is alert and oriented to person, place, and time. Cranial Nerves: No cranial nerve deficit. Psychiatric:         Judgment: Judgment normal.         Assessment and Plan:    Diagnoses and all orders for this visit:    Rash and nonspecific skin eruption  - uncertain etiology   - has not improved with fungal cream and steroid creams, oral steroids or lotion   - may need biopsy   - trial of ketoconazole shampoo     Moderate episode of recurrent major depressive disorder ([facility] [address]  - PHQ 9 was positive today at a score of 15, CSSRS reviewed   - no current suicidal plans, reviewed to go to ER if thoughts.   - declines referral to psychology and psychiatry   - self stopped zoloft   - trial of remeron to see if helps appetite and sleep - uncertain if helped.      Mixed hyperlipidemia  - watch diet   - on simvastatin   - follow labs     Acquired hypothyroidism  - on levothyroxine 100mcg   - follow labs (last 11/2020)      Chronic kidney disease, stage 3 (HCC)  - follow labs   - avoid NSAIDs    Anemia, chronic disease  - follow labs   - monitor for bleeding   - b12 was normal   - iron borderline low   - recommended iron 3x per week - patient self stopped   - follow labs  - recommend restart      Lower esophageal ring (Schatzki)  - last EGD (7/2020)   - declined PPI     Vitamin D deficiency  - otc supplement   - follow labs Primary generalized (osteo)arthritis  - avoid NSAIDs   - tylenol as needed  - Disability placard letter given to patient   - stable     Elevated PSA  - uncertain as to reason - BPH or other   - uncertain as to utility of test was elevated in past - normal last year and now elevated again   - (5/21) - rec cystoscopy, YUR prostate and prostate b  opCystopanendoscopy, retrograde pyelogram,  transurethral resection of prostate, and biopsy of prostate  path Adenocarcinoma, acinar, chronic pros    Return in about 6 months (around 5/5/2022) for check up and review and labs. Orders Placed This Encounter   Procedures    INFLUENZA, QUADV, ADJUVANTED, 72 YRS =, IM, PF, PREFILL SYR, 0.5ML (FLUAD)    CBC Auto Differential     Standing Status:   Future     Standing Expiration Date:   11/5/2022    Comprehensive Metabolic Panel     Standing Status:   Future     Standing Expiration Date:   11/5/2022    Iron and TIBC     Standing Status:   Future     Standing Expiration Date:   11/5/2022     Order Specific Question:   Is Patient Fasting? Answer:   yes     Order Specific Question:   No of Hours? Answer:   8    Lipid, Fasting     Standing Status:   Future     Standing Expiration Date:   11/5/2022    Magnesium     Standing Status:   Future     Standing Expiration Date:   11/5/2022    Vitamin B12 & Folate     Standing Status:   Future     Standing Expiration Date:   11/5/2022    Urinalysis     Standing Status:   Future     Standing Expiration Date:   11/5/2022    TSH without Reflex     Standing Status:   Future     Standing Expiration Date:   11/5/2022     Requested Prescriptions     Signed Prescriptions Disp Refills    triamcinolone (KENALOG) 0.1 % cream 1 each 1     Sig: Apply topically 2 times daily. Do not apply to face or skin folds, do not use >21 days    ketoconazole (NIZORAL) 2 % shampoo 100 mL 0     Sig: Apply topically daily as needed.     levothyroxine (SYNTHROID) 88 MCG tablet 90 tablet 1     Sig: Take 1 tablet by mouth Daily    ferrous sulfate (IRON 325) 325 (65 Fe) MG tablet 30 tablet 5     Sig: Take 1 tablet by mouth three times a week    mirtazapine (REMERON) 7.5 MG tablet 30 tablet 5     Sig: Take 1 tablet by mouth nightly     Chaparrita Randolph MD  11/5/2021  10:58 AM

## 2022-01-11 DIAGNOSIS — E78.2 MIXED HYPERLIPIDEMIA: ICD-10-CM

## 2022-01-11 DIAGNOSIS — M15.0 PRIMARY GENERALIZED (OSTEO)ARTHRITIS: ICD-10-CM

## 2022-01-11 RX ORDER — SIMVASTATIN 40 MG
40 TABLET ORAL NIGHTLY
Qty: 90 TABLET | Refills: 1 | Status: SHIPPED
Start: 2022-01-11 | End: 2022-05-20 | Stop reason: SDUPTHER

## 2022-05-13 DIAGNOSIS — E78.2 MIXED HYPERLIPIDEMIA: ICD-10-CM

## 2022-05-13 DIAGNOSIS — E03.9 ACQUIRED HYPOTHYROIDISM: ICD-10-CM

## 2022-05-13 DIAGNOSIS — D63.8 ANEMIA, CHRONIC DISEASE: ICD-10-CM

## 2022-05-13 LAB
ALBUMIN SERPL-MCNC: 3.9 G/DL (ref 3.5–5.2)
ALP BLD-CCNC: 52 U/L (ref 40–129)
ALT SERPL-CCNC: 7 U/L (ref 0–40)
ANION GAP SERPL CALCULATED.3IONS-SCNC: 14 MMOL/L (ref 7–16)
AST SERPL-CCNC: 23 U/L (ref 0–39)
BACTERIA: ABNORMAL /HPF
BASOPHILS ABSOLUTE: 0.03 E9/L (ref 0–0.2)
BASOPHILS RELATIVE PERCENT: 0.6 % (ref 0–2)
BILIRUB SERPL-MCNC: 0.5 MG/DL (ref 0–1.2)
BILIRUBIN URINE: NEGATIVE
BLOOD, URINE: ABNORMAL
BUN BLDV-MCNC: 24 MG/DL (ref 6–23)
CALCIUM SERPL-MCNC: 9.3 MG/DL (ref 8.6–10.2)
CHLORIDE BLD-SCNC: 110 MMOL/L (ref 98–107)
CHOLESTEROL, FASTING: 134 MG/DL (ref 0–199)
CLARITY: CLEAR
CO2: 21 MMOL/L (ref 22–29)
COLOR: YELLOW
CREAT SERPL-MCNC: 1.6 MG/DL (ref 0.7–1.2)
EOSINOPHILS ABSOLUTE: 0.01 E9/L (ref 0.05–0.5)
EOSINOPHILS RELATIVE PERCENT: 0.2 % (ref 0–6)
FOLATE: >20 NG/ML (ref 4.8–24.2)
GFR AFRICAN AMERICAN: 49
GFR NON-AFRICAN AMERICAN: 41 ML/MIN/1.73
GLUCOSE BLD-MCNC: 91 MG/DL (ref 74–99)
GLUCOSE URINE: NEGATIVE MG/DL
HCT VFR BLD CALC: 37 % (ref 37–54)
HDLC SERPL-MCNC: 46 MG/DL
HEMOGLOBIN: 11.6 G/DL (ref 12.5–16.5)
IMMATURE GRANULOCYTES #: 0.02 E9/L
IMMATURE GRANULOCYTES %: 0.4 % (ref 0–5)
IRON SATURATION: 24 % (ref 20–55)
IRON: 69 MCG/DL (ref 59–158)
KETONES, URINE: NEGATIVE MG/DL
LDL CHOLESTEROL CALCULATED: 72 MG/DL (ref 0–99)
LEUKOCYTE ESTERASE, URINE: ABNORMAL
LYMPHOCYTES ABSOLUTE: 1.88 E9/L (ref 1.5–4)
LYMPHOCYTES RELATIVE PERCENT: 34.7 % (ref 20–42)
MAGNESIUM: 2.3 MG/DL (ref 1.6–2.6)
MCH RBC QN AUTO: 27.6 PG (ref 26–35)
MCHC RBC AUTO-ENTMCNC: 31.4 % (ref 32–34.5)
MCV RBC AUTO: 87.9 FL (ref 80–99.9)
MONOCYTES ABSOLUTE: 1.5 E9/L (ref 0.1–0.95)
MONOCYTES RELATIVE PERCENT: 27.7 % (ref 2–12)
NEUTROPHILS ABSOLUTE: 1.98 E9/L (ref 1.8–7.3)
NEUTROPHILS RELATIVE PERCENT: 36.4 % (ref 43–80)
NITRITE, URINE: NEGATIVE
PDW BLD-RTO: 17.1 FL (ref 11.5–15)
PH UA: 6 (ref 5–9)
PLATELET # BLD: 209 E9/L (ref 130–450)
PMV BLD AUTO: 11.1 FL (ref 7–12)
POTASSIUM SERPL-SCNC: 4.4 MMOL/L (ref 3.5–5)
PROTEIN UA: NEGATIVE MG/DL
RBC # BLD: 4.21 E12/L (ref 3.8–5.8)
RBC UA: ABNORMAL /HPF (ref 0–2)
SODIUM BLD-SCNC: 145 MMOL/L (ref 132–146)
SPECIFIC GRAVITY UA: 1.01 (ref 1–1.03)
TOTAL IRON BINDING CAPACITY: 292 MCG/DL (ref 250–450)
TOTAL PROTEIN: 6.9 G/DL (ref 6.4–8.3)
TRIGLYCERIDE, FASTING: 80 MG/DL (ref 0–149)
TSH SERPL DL<=0.05 MIU/L-ACNC: 0.68 UIU/ML (ref 0.27–4.2)
UROBILINOGEN, URINE: 0.2 E.U./DL
VITAMIN B-12: 1124 PG/ML (ref 211–946)
VLDLC SERPL CALC-MCNC: 16 MG/DL
WBC # BLD: 5.4 E9/L (ref 4.5–11.5)
WBC UA: ABNORMAL /HPF (ref 0–5)

## 2022-05-15 ENCOUNTER — TELEPHONE (OUTPATIENT)
Dept: FAMILY MEDICINE CLINIC | Age: 87
End: 2022-05-15

## 2022-05-15 NOTE — TELEPHONE ENCOUNTER
Let the patient know that blood work results showed    Labs still show kidney dysfunction and slightly worse when compared to previous    Chloride level was slightly elevated and bicarbonate level was borderline decreased.   Uncertain as to the exact cause but could be related to kidney dysfunction or dehydration    Other electrolytes and liver functions were normal    Blood counts showed anemia but slightly improved when compared to previous    Iron levels were improved when compared to previous    Urine analysis showed microscopic blood and white cells    Vitamin W49 and folic acid levels were normal    Thyroid labs were improved and back in normal range slightly borderline to overactive    Cholesterol levels were good    Thanks

## 2022-05-18 NOTE — TELEPHONE ENCOUNTER
Tried to reach the patient, but no answer and no voicemail. He has an appointment on Friday with Dr Samuel Castro.

## 2022-05-19 NOTE — TELEPHONE ENCOUNTER
I finally reached [address], but he did not want to go over the labs. He wants to go over all this at his appointment with Dr More Ortega tomorrow.

## 2022-05-20 ENCOUNTER — OFFICE VISIT (OUTPATIENT)
Dept: PRIMARY CARE CLINIC | Age: 87
End: 2022-05-20
Payer: MEDICARE

## 2022-05-20 VITALS
BODY MASS INDEX: 20.66 KG/M2 | DIASTOLIC BLOOD PRESSURE: 64 MMHG | WEIGHT: 144 LBS | SYSTOLIC BLOOD PRESSURE: 122 MMHG | OXYGEN SATURATION: 99 % | HEART RATE: 77 BPM | TEMPERATURE: 97.4 F

## 2022-05-20 DIAGNOSIS — R73.01 IMPAIRED FASTING GLUCOSE: ICD-10-CM

## 2022-05-20 DIAGNOSIS — D63.8 ANEMIA, CHRONIC DISEASE: ICD-10-CM

## 2022-05-20 DIAGNOSIS — R97.20 ELEVATED PSA: ICD-10-CM

## 2022-05-20 DIAGNOSIS — E55.9 VITAMIN D DEFICIENCY: ICD-10-CM

## 2022-05-20 DIAGNOSIS — E78.2 MIXED HYPERLIPIDEMIA: ICD-10-CM

## 2022-05-20 DIAGNOSIS — M15.0 PRIMARY GENERALIZED (OSTEO)ARTHRITIS: ICD-10-CM

## 2022-05-20 DIAGNOSIS — R53.83 OTHER FATIGUE: ICD-10-CM

## 2022-05-20 DIAGNOSIS — F33.1 MODERATE EPISODE OF RECURRENT MAJOR DEPRESSIVE DISORDER (HCC): ICD-10-CM

## 2022-05-20 DIAGNOSIS — K22.2 LOWER ESOPHAGEAL RING (SCHATZKI): ICD-10-CM

## 2022-05-20 DIAGNOSIS — N18.30 STAGE 3 CHRONIC KIDNEY DISEASE, UNSPECIFIED WHETHER STAGE 3A OR 3B CKD (HCC): Primary | ICD-10-CM

## 2022-05-20 DIAGNOSIS — E03.9 ACQUIRED HYPOTHYROIDISM: ICD-10-CM

## 2022-05-20 PROCEDURE — 1123F ACP DISCUSS/DSCN MKR DOCD: CPT | Performed by: INTERNAL MEDICINE

## 2022-05-20 PROCEDURE — 1036F TOBACCO NON-USER: CPT | Performed by: INTERNAL MEDICINE

## 2022-05-20 PROCEDURE — G8427 DOCREV CUR MEDS BY ELIG CLIN: HCPCS | Performed by: INTERNAL MEDICINE

## 2022-05-20 PROCEDURE — G8420 CALC BMI NORM PARAMETERS: HCPCS | Performed by: INTERNAL MEDICINE

## 2022-05-20 PROCEDURE — 99214 OFFICE O/P EST MOD 30 MIN: CPT | Performed by: INTERNAL MEDICINE

## 2022-05-20 RX ORDER — ACETAMINOPHEN 160 MG
TABLET,DISINTEGRATING ORAL
COMMUNITY

## 2022-05-20 RX ORDER — LEVOTHYROXINE SODIUM 88 UG/1
88 TABLET ORAL DAILY
Qty: 90 TABLET | Refills: 1 | Status: SHIPPED | OUTPATIENT
Start: 2022-05-20

## 2022-05-20 RX ORDER — ASPIRIN 325 MG
325 TABLET ORAL
COMMUNITY
Start: 2006-11-30

## 2022-05-20 RX ORDER — MIRTAZAPINE 7.5 MG/1
7.5 TABLET, FILM COATED ORAL NIGHTLY
Qty: 30 TABLET | Refills: 5 | Status: CANCELLED | OUTPATIENT
Start: 2022-05-20

## 2022-05-20 RX ORDER — SIMVASTATIN 40 MG
40 TABLET ORAL NIGHTLY
Qty: 90 TABLET | Refills: 1 | Status: SHIPPED | OUTPATIENT
Start: 2022-05-20

## 2022-05-20 ASSESSMENT — ENCOUNTER SYMPTOMS
SORE THROAT: 0
CHEST TIGHTNESS: 0
BLOOD IN STOOL: 0
COUGH: 0
SHORTNESS OF BREATH: 0
EYE PAIN: 0
CONSTIPATION: 0
RHINORRHEA: 1
VOMITING: 0
DIARRHEA: 0
ABDOMINAL PAIN: 0
NAUSEA: 0

## 2022-05-20 ASSESSMENT — PATIENT HEALTH QUESTIONNAIRE - PHQ9
3. TROUBLE FALLING OR STAYING ASLEEP: 0
2. FEELING DOWN, DEPRESSED OR HOPELESS: 0
SUM OF ALL RESPONSES TO PHQ QUESTIONS 1-9: 0
1. LITTLE INTEREST OR PLEASURE IN DOING THINGS: 0
5. POOR APPETITE OR OVEREATING: 0
SUM OF ALL RESPONSES TO PHQ QUESTIONS 1-9: 0
SUM OF ALL RESPONSES TO PHQ9 QUESTIONS 1 & 2: 0
6. FEELING BAD ABOUT YOURSELF - OR THAT YOU ARE A FAILURE OR HAVE LET YOURSELF OR YOUR FAMILY DOWN: 0
9. THOUGHTS THAT YOU WOULD BE BETTER OFF DEAD, OR OF HURTING YOURSELF: 0
7. TROUBLE CONCENTRATING ON THINGS, SUCH AS READING THE NEWSPAPER OR WATCHING TELEVISION: 0
SUM OF ALL RESPONSES TO PHQ QUESTIONS 1-9: 0
8. MOVING OR SPEAKING SO SLOWLY THAT OTHER PEOPLE COULD HAVE NOTICED. OR THE OPPOSITE, BEING SO FIGETY OR RESTLESS THAT YOU HAVE BEEN MOVING AROUND A LOT MORE THAN USUAL: 0
SUM OF ALL RESPONSES TO PHQ QUESTIONS 1-9: 0
4. FEELING TIRED OR HAVING LITTLE ENERGY: 0

## 2022-05-20 NOTE — PROGRESS NOTES
[facility]) Physicians   Internal Medicine     2022  Matt Leonard : 1929 Sex: male  Age:89 y.o. Chief Complaint   Patient presents with    Hypothyroidism    Anxiety        HPI:   Patient presents to office for evaluation of the following  medical concerns. - States has right shoulder and upper arm pain. States coems and goes     - States has been dealing with rash. States pruritic. States has been to 2 different dermatologist and 2 visits to urgent care. States has been using triamcinolone cream and lac hydrin. States was given zyretec and prednisone. Also treated with cephalexin. - Depression and anxiety is unchanged. screening was positive on office visit (2020). States symptoms of lonely and having no one to talk to causes depression. No current suicidal plans. Self stopped zoloft. Not taking remeron. States has esophageal stricture and ring. Follows with GI. States has had esophageal dilation. Not taking omeprazole. Had EGD () 1. Schatzki's ring s/p dilation, will need periodic dilation. Symptoms improved. Last visit with GI per reviewed note  () - dysphagia, poss symptomatic ring poss EGD. States has high cholesterol. Watching diet. On Simvastatin. No reported side effects. States has hypothyroidism. On levothyroxine. Last lab (2022)    States has arthritis. Has pain to shoulder an ankle. Symptoms are intermittent. Stable. States has chronic kidney disease. Not currently following with nephrology. Discussed medications to avoid. Last lab (10/2021)  stable. States has anemia. Has had colonoscopy. Following labs - last labs (2020) improved. States stopped taking iron, recommend to take iron 3x per week. Last labs was improved (2022)      States has enlarged prostate. PSA was elevated at 7.69. States having urinary frequency.  () - rec cystoscopy, YUR prostate and prostate bx opCystopanendoscopy, retrograde pyelogram, transurethral resection of prostate, and biopsy of prostate path Adenocarcinoma, acinar, chronic pros. PSA (11/21) - 8.99  urology (11/21) -prostate cancer, following with PSA, urinary frequency, not interested in treatment, follow-up 1 year    States has vitamin D def. On otc supplement     derm path (5/21) - IMPETIGINIZED ULCER lt chin, path truamatic     - lab results from 5/13/2022 reviewed with patient 5/20/2022    Health Maintenance   - immunizations:   Influenza Vaccination - (2020), (2021)   Pneumonia Vaccination  Zoster/Shingles Vaccine  Tetanus Vaccination  covid (4/6/2021) #1, (4/28/2021) #2, (10/21/2021) #3 - pfizer     - Screenings:   Colonoscopy   Prostate     EGD (7/20) 1. Schatzki's ring s/p dilation, will need periodic dilation   2. Essentially no hiatal hernia. 3.  Nonspecific antral and duodenal erythema without erosions or Ulcerations. ROS:  Review of Systems   Constitutional: Negative for appetite change, chills, fever and unexpected weight change. HENT: Positive for rhinorrhea. Negative for congestion and sore throat. Eyes: Negative for pain and visual disturbance. Respiratory: Negative for cough, chest tightness and shortness of breath. Cardiovascular: Negative for chest pain and palpitations. Gastrointestinal: Negative for abdominal pain, blood in stool, constipation, diarrhea, nausea and vomiting. Genitourinary: Positive for frequency and urgency. Negative for difficulty urinating, dysuria, hematuria, scrotal swelling and testicular pain. Musculoskeletal: Negative for arthralgias and gait problem. Skin: Negative for rash. Neurological: Negative for dizziness, syncope, weakness, light-headedness and headaches. Hematological: Negative for adenopathy. Does not bruise/bleed easily. Psychiatric/Behavioral: Negative for suicidal ideas. The patient is not nervous/anxious.           Current Outpatient Medications:     aspirin 325 MG tablet, 325 mg, Disp: , Rfl:     simvastatin (ZOCOR) 40 MG tablet, Take 1 tablet by mouth nightly, Disp: 90 tablet, Rfl: 1    levothyroxine (SYNTHROID) 88 MCG tablet, Take 1 tablet by mouth Daily, Disp: 90 tablet, Rfl: 1    Cholecalciferol (VITAMIN D3) 50 MCG (2000 UT) CAPS, Take by mouth, Disp: , Rfl:     No Known Allergies    Past Medical History:   Diagnosis Date    Anemia     Arthritis     generalized    CKD (chronic kidney disease)     Dysphagia     has esophageal dilitations every 6-8 monhts    Enlarged prostate     BPH, dysuria    Hyperlipidemia     Lower esophageal ring (Schatzki) 05/02/2019    Mixed hyperlipidemia 5/2/2019    Thyroid disease        Past Surgical History:   Procedure Laterality Date    CATARACT REMOVAL WITH IMPLANT Bilateral     ESOPHAGUS SURGERY      x 3    PROSTATE BIOPSY N/A 6/7/2021    PROSTATE BIOPSY performed by Serena Hendricks MD at [address] [place] [address] TURP N/A 6/7/2021    CYSTOSCOPY RETROGRADE PYELOGRAMS TRANSURETHRAL RESECTION PROSTATE performed by Serena Hendricks MD at [address] [facility] N/A 7/7/2020    EGD WITH ESOPHAGEAL  DILATATION performed by Leeann Garcia MD at [facility] N/A 5/4/2021    EGD ESOPHAGOGASTRODUODENOSCOPY WITH DILATION performed by Leeann Garcia MD at [facility] END[facility]COPY       No family history on file. Social History     Socioeconomic History    Marital status:       Spouse name: Not on file    Number of children: Not on file    Years of education: Not on file    Highest education level: Not on file   Occupational History    Not on file   Tobacco Use    Smoking status: Never Smoker    Smokeless tobacco: Never Used   Vaping Use    Vaping Use: Never used   Substance and Sexual Activity    Alcohol use: Not Currently    Drug use: Never    Sexual activity: Not on file   Other Topics Concern    Not on file   Social History Narrative    Not on file     Social Determinants of Health Financial Resource Strain: Low Risk     Difficulty of Paying Living Expenses: Not hard at all   Food Insecurity: No Food Insecurity    Worried About Running Out of Food in the Last Year: Never true    Dora of Food in the Last Year: Never true   Transportation Needs:     Lack of Transportation (Medical): Not on file    Lack of Transportation (Non-Medical): Not on file   Physical Activity:     Days of Exercise per Week: Not on file    Minutes of Exercise per Session: Not on file   Stress:     Feeling of Stress : Not on file   Social Connections:     Frequency of Communication with Friends and Family: Not on file    Frequency of Social Gatherings with Friends and Family: Not on file    Attends Yazdanism Services: Not on file    Active Member of [address] [place] [address] [employer] or Organizations: Not on file    Attends Club or Organization Meetings: Not on file    Marital Status: Not on file   Intimate Partner Violence:     Fear of Current or Ex-Partner: Not on file    Emotionally Abused: Not on file    Physically Abused: Not on file    Sexually Abused: Not on file   Housing Stability:     Unable to Pay for Housing in the Last Year: Not on file    Number of [place] in the Last Year: Not on file    Unstable Housing in the Last Year: Not on file       Vitals:    05/20/22 1540   BP: 122/64   Site: Right Upper Arm   Position: Sitting   Pulse: 77   Temp: 97.4 °F (36.3 °C)   TempSrc: Temporal   SpO2: 99%   Weight: 144 lb (65.3 kg)       Exam:  Physical Exam  Constitutional:       Appearance: He is well-developed. HENT:      Head: Normocephalic and atraumatic. Right Ear: External ear normal.      Left Ear: External ear normal.   Eyes:      Pupils: Pupils are equal, round, and reactive to light. Neck:      Thyroid: No thyromegaly. Cardiovascular:      Rate and Rhythm: Normal rate and regular rhythm. Heart sounds: Normal heart sounds. No murmur heard.       Pulmonary:      Effort: Pulmonary effort is normal. Breath sounds: Normal breath sounds. No wheezing or rales. Abdominal:      General: Bowel sounds are normal.      Palpations: Abdomen is soft. Tenderness: There is no abdominal tenderness. There is no guarding or rebound. Musculoskeletal:         General: Normal range of motion. Cervical back: Normal range of motion and neck supple. Lymphadenopathy:      Cervical: No cervical adenopathy. Skin:     General: Skin is warm and dry. Neurological:      Mental Status: He is alert and oriented to person, place, and time. Cranial Nerves: No cranial nerve deficit.    Psychiatric:         Judgment: Judgment normal.         Assessment and Plan:    Diagnoses and all orders for this visit:    Moderate episode of recurrent major depressive disorder ([facility]  - PHQ 9 was positive today at a score of 15, CSSRS reviewed   - no current suicidal plans, reviewed to go to ER if thoughts.   - declines referral to psychology and psychiatry   - self stopped zoloft   - self stopped remeron  - last PHQ score of 0 (5/20/2022)     Mixed hyperlipidemia  - watch diet   - on simvastatin   - follow labs     Acquired hypothyroidism  - on levothyroxine 100mcg   - follow labs (last 5/2022)      Chronic kidney disease, stage 3 ([facility]  - follow labs   - avoid NSAIDs    Anemia, chronic disease  - follow labs   - monitor for bleeding   - b12 was normal   - iron borderline low   - recommended iron 3x per week - patient self stopped   - follow labs  - recommend restart      Lower esophageal ring (Schatzki)  - last EGD (7/2020)   - declined PPI     Vitamin D deficiency  - otc supplement   - follow labs     Primary generalized (osteo)arthritis  - avoid NSAIDs   - tylenol as needed  - Disability placard letter given to patient   - stable     Elevated PSA  - uncertain as to reason - BPH or other   - uncertain as to utility of test was elevated in past - normal last year and now elevated again   - (5/21) - rec cystoscopy, YUR prostate and prostate b  opCystopanendoscopy, retrograde pyelogram,  transurethral resection of prostate, and biopsy of prostate  path Adenocarcinoma, acinar, chronic pros    Rash and nonspecific skin eruption  - uncertain etiology   - has not improved with fungal cream and steroid creams, oral steroids or lotion   - may need biopsy   - trial of ketoconazole shampoo     Return in about 6 months (around 11/20/2022) for check up and review and labs. Orders Placed This Encounter   Procedures    CBC with Auto Differential     Standing Status:   Future     Standing Expiration Date:   5/20/2023    Magnesium     Standing Status:   Future     Standing Expiration Date:   5/20/2023    Lipid, Fasting     Standing Status:   Future     Standing Expiration Date:   5/20/2023    Iron and TIBC     Standing Status:   Future     Standing Expiration Date:   5/20/2023     Order Specific Question:   Is Patient Fasting? Answer:   yes     Order Specific Question:   No of Hours?      Answer:   8    Comprehensive Metabolic Panel     Standing Status:   Future     Standing Expiration Date:   5/20/2023    Ferritin     Standing Status:   Future     Standing Expiration Date:   5/20/2023    Hemoglobin A1C     Standing Status:   Future     Standing Expiration Date:   5/20/2023    Vitamin B12 & Folate     Standing Status:   Future     Standing Expiration Date:   5/20/2023    Vitamin D 25 Hydroxy     Standing Status:   Future     Standing Expiration Date:   5/20/2023    Urinalysis     Standing Status:   Future     Standing Expiration Date:   5/20/2023    TSH     Standing Status:   Future     Standing Expiration Date:   5/20/2023     Requested Prescriptions     Signed Prescriptions Disp Refills    simvastatin (ZOCOR) 40 MG tablet 90 tablet 1     Sig: Take 1 tablet by mouth nightly    levothyroxine (SYNTHROID) 88 MCG tablet 90 tablet 1     Sig: Take 1 tablet by mouth Daily     Valerie Peguero MD  5/20/2022  4:27 PM

## 2022-12-16 ENCOUNTER — PREP FOR PROCEDURE (OUTPATIENT)
Dept: GASTROENTEROLOGY | Age: 87
End: 2022-12-16

## 2022-12-16 RX ORDER — SODIUM CHLORIDE 0.9 % (FLUSH) 0.9 %
5-40 SYRINGE (ML) INJECTION PRN
Status: CANCELLED | OUTPATIENT
Start: 2022-12-16

## 2022-12-16 RX ORDER — SODIUM CHLORIDE 9 MG/ML
INJECTION, SOLUTION INTRAVENOUS CONTINUOUS
Status: CANCELLED | OUTPATIENT
Start: 2022-12-16

## 2022-12-16 RX ORDER — SODIUM CHLORIDE 0.9 % (FLUSH) 0.9 %
5-40 SYRINGE (ML) INJECTION EVERY 12 HOURS SCHEDULED
Status: CANCELLED | OUTPATIENT
Start: 2022-12-16

## 2022-12-16 RX ORDER — SODIUM CHLORIDE 9 MG/ML
INJECTION, SOLUTION INTRAVENOUS PRN
Status: CANCELLED | OUTPATIENT
Start: 2022-12-16

## 2022-12-16 NOTE — H&P
PATIENT NAME: Kristan Stagesarah NUMBER: [de-identified]    YOB: 1929    DATE: 12/12/2022    PRIMARY CARE PROVIDER:  Waqas Talbot MD.    PROBLEM:  Dysphagia/recurrent/ring. SUBJECTIVE:  Carlos Ormond says the last time we encountered each other was two years ago, but in fact it was one year ago. He visited the office in 2020. An endoscopy was performed for esophageal dilatation as it has been on numerous occasions in the past from a symptomatic ring. He underwent another endoscopic evaluation a year ago. An esophageal dilatation was again performed, but he was not seen in the office. He returns with recurrent symptoms of dysphagia without weight loss. He lives alone. Difficulty with transportation issues for procedures. Daughter lives in [facility] [place]. He travels locally to [facility] in [facility]. Does not travel to [place] or [facility] [place] anymore. He suggests that he has lived long enough and would prefer just to fade away. He is maintaining his weight. He is mildly anemic, but has been for years with normochromic, normocytic indices and normal iron studies. In the interim, he has apparently had some issues with rashes. At one point very remotely there was a fever at onset. Dr. Bridget Fletcher note suggests basal cell cancers have been noted and referred to Dermatology. He is on iron daily. MEDICATIONS:  Thyroid, simvastatin, Tylenol, tamsulosin and iron. ALLERGIES:  No listed allergies. OBJECTIVE:  He is a bright, alert, elderly gentleman appearing younger than his stated age of 80. He weighs 151 pounds compares favorably to 148 pounds in 2018 and 157 pounds two years ago. His blood pressure is 160/90. His pulse is regular. He is alert and coherent. Not noticeably pale. Neck veins are not distended. Lung fields are clear. Heart tones are normal.  Regular rate. Regular rhythm. No audible murmur. No gallop. A benign abdomen without edema.     ASSESSMENT:  He has asymptomatic ring which is recurrent despite adequate dilatation over time. He wishes to proceed with endoscopic re-evaluation and dilatation currently. PLAN:  EGD. Ching Mccray of his choosing. I will take care of his transportation home. He is agreeable to this. We do not yet have a time and date scheduled.

## 2022-12-16 NOTE — H&P (VIEW-ONLY)
PATIENT NAME: Yaneth Oconnell NUMBER: [de-identified]    YOB: 1929    DATE: 12/12/2022    PRIMARY CARE PROVIDER:  Eveline Atwood MD.    PROBLEM:  Dysphagia/recurrent/ring. SUBJECTIVE:  Ada Aguilera says the last time we encountered each other was two years ago, but in fact it was one year ago. He visited the office in 2020. An endoscopy was performed for esophageal dilatation as it has been on numerous occasions in the past from a symptomatic ring. He underwent another endoscopic evaluation a year ago. An esophageal dilatation was again performed, but he was not seen in the office. He returns with recurrent symptoms of dysphagia without weight loss. He lives alone. Difficulty with transportation issues for procedures. Daughter lives in [place]. He travels locally to [facility] in [facility]. Does not travel to [place] or [place] anymore. He suggests that he has lived long enough and would prefer just to fade away. He is maintaining his weight. He is mildly anemic, but has been for years with normochromic, normocytic indices and normal iron studies. In the interim, he has apparently had some issues with rashes. At one point very remotely there was a fever at onset. Dr. Vito Hall note suggests basal cell cancers have been noted and referred to Dermatology. He is on iron daily. MEDICATIONS:  Thyroid, simvastatin, Tylenol, tamsulosin and iron. ALLERGIES:  No listed allergies. OBJECTIVE:  He is a bright, alert, elderly gentleman appearing younger than his stated age of 80. He weighs 151 pounds compares favorably to 148 pounds in 2018 and 157 pounds two years ago. His blood pressure is 160/90. His pulse is regular. He is alert and coherent. Not noticeably pale. Neck veins are not distended. Lung fields are clear. Heart tones are normal.  Regular rate. Regular rhythm. No audible murmur. No gallop. A benign abdomen without edema.     ASSESSMENT:  He has asymptomatic ring which is recurrent despite adequate dilatation over time. He wishes to proceed with endoscopic re-evaluation and dilatation currently. PLAN:  EGD. Dina Enriquez of his choosing. I will take care of his transportation home. He is agreeable to this. We do not yet have a time and date scheduled.

## 2022-12-22 RX ORDER — FERROUS SULFATE 325(65) MG
325 TABLET ORAL
COMMUNITY

## 2022-12-22 NOTE — PROGRESS NOTES
[facility] PRE-ADMISSION TESTING INSTRUCTIONS    The Preadmission Testing patient is instructed accordingly using the following criteria (check applicable):    ARRIVAL INSTRUCTIONS:  [x] Parking the day of Surgery is located in the Main Entrance lot. Upon entering the door, make an immediate right to the surgery reception desk    [x] Bring photo ID and insurance card    [] Bring in a copy of Living will or Durable Power of  papers. [x] Please be sure to arrange for responsible adult to provide transportation to and from the hospital    [x] Please arrange for responsible adult to be with you for the 24 hour period post procedure due to having anesthesia    [x] If you awake am of surgery not feeling well or have temperature >100 please call [phone]    GENERAL INSTRUCTIONS:    [x] Nothing by mouth after midnight, including gum, candy, mints or water    [x] You may brush your teeth, but do not swallow any water    [] Take medications as instructed with 1-2 oz of water    [x] Stop herbal supplements and vitamins 5 days prior to procedure    [x] Follow preop dosing of blood thinners per physician instructions    [] Take 1/2 dose of evening insulin, but no insulin after midnight    [] No oral diabetic medications after midnight    [] If diabetic and have low blood sugar or feel symptomatic, take 1-2oz apple juice only    [] Bring inhalers day of surgery    [] Bring C-PAP/ Bi-Pap day of surgery    [] Bring urine specimen day of surgery    [x] Shower or bath with soap, lather and rinse well, AM of Surgery, no lotion, powders or creams     [] Follow bowel prep as instructed per surgeon    [x] No tobacco products within 24 hours of surgery     [x] No alcohol or illegal drug use within 24 hours of surgery.     = Jewelry, body piercing's, eyeglasses, contact lenses and dentures are not permitted into surgery (bring cases)      [] Please do not wear any nail polish, make up or hair products on the day of surgery    [x] You can expect a call the business day prior to procedure to notify you if your arrival time changes    [x] If you receive a survey after surgery we would greatly appreciate your comments    [] Parent/guardian of a minor must accompany their child and remain on the premises  the entire time they are under our care     [] Pediatric patients may bring favorite toy, blanket or comfort item with them    [x] A caregiver or family member must remain with the patient during their stay if they are mentally handicapped, have dementia, disoriented or unable to use a call light or would be a safety concern if left unattended    [x] Please notify surgeon if you develop any illness between now and time of surgery (cold, cough, sore throat, fever, nausea, vomiting) or any signs of infections  including skin, wounds, and dental.    [x]  The Outpatient Pharmacy is available to fill your prescription here on your day of surgery, ask your preop nurse for details    [x] Other instructions: wear loose,comfortable clothing    EDUCATIONAL MATERIALS PROVIDED:    [] PAT Preoperative Education Packet/Booklet     [] Medication List    [] Transfusion bracelet applied with instructions    [] Shower with soap, lather and rinse well, and use CHG wipes provided the evening before surgery as instructed    [] Incentive spirometer with instructions

## 2022-12-27 ENCOUNTER — ANESTHESIA EVENT (OUTPATIENT)
Dept: ENDOSCOPY | Age: 87
End: 2022-12-27
Payer: MEDICARE

## 2022-12-27 ASSESSMENT — LIFESTYLE VARIABLES: SMOKING_STATUS: 0

## 2022-12-28 ENCOUNTER — ANESTHESIA (OUTPATIENT)
Dept: ENDOSCOPY | Age: 87
End: 2022-12-28
Payer: MEDICARE

## 2022-12-28 ENCOUNTER — HOSPITAL ENCOUNTER (OUTPATIENT)
Age: 87
Setting detail: OUTPATIENT SURGERY
Discharge: HOME | End: 2022-12-28
Attending: INTERNAL MEDICINE | Admitting: INTERNAL MEDICINE
Payer: MEDICARE

## 2022-12-28 VITALS
OXYGEN SATURATION: 96 % | HEIGHT: 70 IN | RESPIRATION RATE: 14 BRPM | TEMPERATURE: 97 F | DIASTOLIC BLOOD PRESSURE: 72 MMHG | WEIGHT: 144 LBS | SYSTOLIC BLOOD PRESSURE: 141 MMHG | BODY MASS INDEX: 20.62 KG/M2 | HEART RATE: 92 BPM

## 2022-12-28 PROCEDURE — 3700000000 HC ANESTHESIA ATTENDED CARE: Performed by: INTERNAL MEDICINE

## 2022-12-28 PROCEDURE — 2720000010 HC SURG SUPPLY STERILE: Performed by: INTERNAL MEDICINE

## 2022-12-28 PROCEDURE — 2709999900 HC NON-CHARGEABLE SUPPLY: Performed by: INTERNAL MEDICINE

## 2022-12-28 PROCEDURE — 2580000003 HC RX 258: Performed by: INTERNAL MEDICINE

## 2022-12-28 PROCEDURE — 3609017700 HC EGD DILATION GASTRIC/DUODENAL STRICTURE: Performed by: INTERNAL MEDICINE

## 2022-12-28 PROCEDURE — 3700000001 HC ADD 15 MINUTES (ANESTHESIA): Performed by: INTERNAL MEDICINE

## 2022-12-28 PROCEDURE — 7100000010 HC PHASE II RECOVERY - FIRST 15 MIN: Performed by: INTERNAL MEDICINE

## 2022-12-28 PROCEDURE — 6360000002 HC RX W HCPCS: Performed by: NURSE ANESTHETIST, CERTIFIED REGISTERED

## 2022-12-28 PROCEDURE — 7100000011 HC PHASE II RECOVERY - ADDTL 15 MIN: Performed by: INTERNAL MEDICINE

## 2022-12-28 RX ORDER — SODIUM CHLORIDE 0.9 % (FLUSH) 0.9 %
5-40 SYRINGE (ML) INJECTION PRN
Status: DISCONTINUED | OUTPATIENT
Start: 2022-12-28 | End: 2022-12-28 | Stop reason: HOSPADM

## 2022-12-28 RX ORDER — SODIUM CHLORIDE 9 MG/ML
INJECTION, SOLUTION INTRAVENOUS CONTINUOUS
Status: DISCONTINUED | OUTPATIENT
Start: 2022-12-28 | End: 2022-12-28 | Stop reason: HOSPADM

## 2022-12-28 RX ORDER — SODIUM CHLORIDE 0.9 % (FLUSH) 0.9 %
5-40 SYRINGE (ML) INJECTION EVERY 12 HOURS SCHEDULED
Status: DISCONTINUED | OUTPATIENT
Start: 2022-12-28 | End: 2022-12-28 | Stop reason: HOSPADM

## 2022-12-28 RX ORDER — SODIUM CHLORIDE 9 MG/ML
INJECTION, SOLUTION INTRAVENOUS PRN
Status: DISCONTINUED | OUTPATIENT
Start: 2022-12-28 | End: 2022-12-28 | Stop reason: HOSPADM

## 2022-12-28 RX ORDER — PROPOFOL 10 MG/ML
INJECTION, EMULSION INTRAVENOUS PRN
Status: DISCONTINUED | OUTPATIENT
Start: 2022-12-28 | End: 2022-12-28 | Stop reason: SDUPTHER

## 2022-12-28 RX ADMIN — PROPOFOL 130 MG: 10 INJECTION, EMULSION INTRAVENOUS at 09:38

## 2022-12-28 RX ADMIN — SODIUM CHLORIDE: 9 INJECTION, SOLUTION INTRAVENOUS at 09:20

## 2022-12-28 ASSESSMENT — PAIN SCALES - GENERAL: PAINLEVEL_OUTOF10: 0

## 2022-12-28 NOTE — ANESTHESIA POSTPROCEDURE EVALUATION
Department of Anesthesiology  Postprocedure Note    Patient: Matthew Knight  MRN: [id number]  YOB: 1929  Date of evaluation: 12/28/2022      Procedure Summary     Date: 12/28/22 Room / Location: [facility] / [facility]    Anesthesia Start: 5781 Anesthesia Stop: 8204    Procedure: EGD DILATION BALLOON Diagnosis:       Dysphagia, unspecified type      (Dysphagia, unspecified type [R13.10])    Surgeons: Marek Awad MD Responsible Provider: Alyssia Urias MD    Anesthesia Type: MAC ASA Status: 3          Anesthesia Type: No value filed.     Letitia Phase I: Letitia Score: 10    Letitia Phase II: Letitia Score: 10      Anesthesia Post Evaluation    Patient location during evaluation: bedside  Patient participation: complete - patient participated  Level of consciousness: awake  Pain score: 0  Airway patency: patent  Nausea & Vomiting: no nausea and no vomiting  Cardiovascular status: hemodynamically stable  Respiratory status: acceptable  Hydration status: euvolemic

## 2022-12-28 NOTE — DISCHARGE INSTRUCTIONS
Follow all instructions carefully:      Restrictions: Do not drive or operate machinery for eighteen hours after sedation. Diet:  soft diet for 24 hrs       Medications: [unfilled]      Follow up Care: Follow-up: As needed    If problems with abdominal pain, nausea, vomiting, bleeding or any other concerns call Dr. Akhil De La Rosa at [phone] or Answering Service [phone], If unable to reach me call [facility] Emergency Room.     Bhupendra Danielle MD M.D.

## 2022-12-28 NOTE — BRIEF OP NOTE
Brief Postoperative Note      Patient: Vanessa Mcdonnell  YOB: 1929  MRN: [id number]    Date of Procedure: 12/28/2022    Pre-Op Diagnosis: Dysphagia, unspecified type [R13.10]    Post-Op Diagnosis:  symptomatic ring, dilated 19 mm by TTS balloon       Procedure(s):  EGD DILATION BALLOON    Surgeon(s):  Julissa Parker MD    Assistant:  * No surgical staff found *    Anesthesia: Monitor Anesthesia Care    Estimated Blood Loss (mL): Minimal    Complications: None    Specimens:   * No specimens in log *    Implants:  * No implants in log *      Drains:   Urethral Catheter Latex 24 fr (Active)       Findings: recurrent ring    Electronically signed by Julissa Parker MD on 12/28/2022 at 9:53 AM

## 2022-12-28 NOTE — INTERVAL H&P NOTE
Update History & Physical    The patient's History and Physical of December 12, 2022 was reviewed with the patient and I examined the patient. There was no change. The surgical site was confirmed by the patient and me. Plan: The risks, benefits, expected outcome, and alternative to the recommended procedure have been discussed with the patient. Patient understands and wants to proceed with the procedure.      Electronically signed by Carlos Hernandez MD on 12/28/2022 at 8:56 AM

## 2022-12-28 NOTE — ANESTHESIA PRE PROCEDURE
Department of Anesthesiology  Preprocedure Note       Name:  Zen Wilkerson   Age:  80 y.o.  :  1929                                          MRN:  [id number]         Date:  2022      Surgeon: Avani Carver):  Bhargav Guerrero MD    Procedure: Procedure(s):  EGD ESOPHAGOGASTRODUODENOSCOPY POSSIBLE DILATION    Medications prior to admission:   Prior to Admission medications    Medication Sig Start Date End Date Taking? Authorizing Provider   ferrous sulfate (IRON 325) 325 (65 Fe) MG tablet Take 325 mg by mouth daily (with breakfast) Takes three times weekly, M,W, F    Historical Provider, MD   aspirin 325 MG tablet Take 325 mg by mouth every 4 hours as needed 06   Historical Provider, MD   simvastatin (ZOCOR) 40 MG tablet Take 1 tablet by mouth nightly 22   Ursula Hussein MD   levothyroxine (SYNTHROID) 88 MCG tablet Take 1 tablet by mouth Daily  Patient taking differently: Take 88 mcg by mouth Daily Taking 75mcg 22   Ursula Hussein MD   Cholecalciferol (VITAMIN D3) 50 MCG (2000 UT) CAPS Take by mouth    Historical Provider, MD       Current medications:    Current Outpatient Medications   Medication Sig Dispense Refill    ferrous sulfate (IRON 325) 325 (65 Fe) MG tablet Take 325 mg by mouth daily (with breakfast) Takes three times weekly, M,W, F      aspirin 325 MG tablet Take 325 mg by mouth every 4 hours as needed      simvastatin (ZOCOR) 40 MG tablet Take 1 tablet by mouth nightly 90 tablet 1    levothyroxine (SYNTHROID) 88 MCG tablet Take 1 tablet by mouth Daily (Patient taking differently: Take 88 mcg by mouth Daily Taking 75mcg) 90 tablet 1    Cholecalciferol (VITAMIN D3) 50 MCG (2000 UT) CAPS Take by mouth       No current facility-administered medications for this visit.        Allergies:  No Known Allergies    Problem List:    Patient Active Problem List   Diagnosis Code    Chronic right shoulder pain M25.511, G89.29    Primary generalized (osteo)arthritis M15.0    Acquired hypothyroidism E03.9    Encounter for immunization Z23    Prostate enlargement N40.0    Fatigue R53.83    Esophageal dysphagia R13.19    Immunization counseling Z71.85    Anemia, chronic disease D63.8    Chronic kidney disease, stage 3 (HCC) N18.30    Mixed hyperlipidemia E78.2    Lower esophageal ring (Schatzki) K22.2    Vitamin D deficiency E55.9    Elevated PSA R97.20    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    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, N13.8       Past Medical History:        Diagnosis Date    Anemia     Arthritis     generalized    CKD (chronic kidney disease)     Dysphagia     has esophageal dilitations every 6-8 monhts    Enlarged prostate     BPH, dysuria    Hyperlipidemia     Lower esophageal ring (Schatzki) 05/02/2019    Mixed hyperlipidemia 5/2/2019    Thyroid disease        Past Surgical History:        Procedure Laterality Date    CATARACT REMOVAL WITH IMPLANT Bilateral     ESOPHAGUS SURGERY      x 3    PROSTATE BIOPSY N/A 6/7/2021    PROSTATE BIOPSY performed by Izabel Snyder MD at [address] [place] [address] TURP N/A 6/7/2021    CYSTOSCOPY RETROGRADE PYELOGRAMS TRANSURETHRAL RESECTION PROSTATE performed by Izabel Snyder MD at [address] N/A 7/7/2020    EGD WITH ESOPHAGEAL  DILATATION performed by Alena Wilson MD at [address] N/A 5/4/2021    EGD ESOPHAGOGASTRODUODENOSCOPY WITH DILATION performed by Alena Wilson MD at [facility] ENDOSCOPY       Social History:    Social History     Tobacco Use    Smoking status: Never    Smokeless tobacco: Never   Substance Use Topics    Alcohol use: Not Currently                                Counseling given: Not Answered      Vital Signs (Current): There were no vitals filed for this visit.                                            BP Readings from Last 3 Encounters:   05/20/22 122/64 11/05/21 132/78   08/27/21 (!) 140/82       NPO Status:                                                                                 BMI:   Wt Readings from Last 3 Encounters:   05/20/22 144 lb (65.3 kg)   11/05/21 147 lb 6.6 oz (66.9 kg)   08/27/21 141 lb (64 kg)     There is no height or weight on file to calculate BMI.    CBC:   Lab Results   Component Value Date/Time    WBC 5.4 05/13/2022 08:49 AM    RBC 4.21 05/13/2022 08:49 AM    HGB 11.6 05/13/2022 08:49 AM    HCT 37.0 05/13/2022 08:49 AM    MCV 87.9 05/13/2022 08:49 AM    RDW 17.1 05/13/2022 08:49 AM     05/13/2022 08:49 AM       CMP:   Lab Results   Component Value Date/Time     05/13/2022 08:49 AM    K 4.4 05/13/2022 08:49 AM     05/13/2022 08:49 AM    CO2 21 05/13/2022 08:49 AM    BUN 24 05/13/2022 08:49 AM    CREATININE 1.6 05/13/2022 08:49 AM    GFRAA 49 05/13/2022 08:49 AM    LABGLOM 41 05/13/2022 08:49 AM    GLUCOSE 91 05/13/2022 08:49 AM    PROT 6.9 05/13/2022 08:49 AM    CALCIUM 9.3 05/13/2022 08:49 AM    BILITOT 0.5 05/13/2022 08:49 AM    ALKPHOS 52 05/13/2022 08:49 AM    AST 23 05/13/2022 08:49 AM    ALT 7 05/13/2022 08:49 AM       POC Tests: No results for input(s): POCGLU, POCNA, POCK, POCCL, POCBUN, POCHEMO, POCHCT in the last 72 hours.     Coags: No results found for: PROTIME, INR, APTT    HCG (If Applicable): No results found for: PREGTESTUR, PREGSERUM, HCG, HCGQUANT     ABGs: No results found for: PHART, PO2ART, JKE4THR, PAN8WDS, BEART, P0RJGVND     Type & Screen (If Applicable):  No results found for: LABABO, LABRH    Drug/Infectious Status (If Applicable):  No results found for: HIV, HEPCAB    COVID-19 Screening (If Applicable):   Lab Results   Component Value Date/Time    COVID19 Not Detected 04/29/2021 08:50 AM           Anesthesia Evaluation  Patient summary reviewed and Nursing notes reviewed no history of anesthetic complications:   Airway: Mallampati: III  TM distance: >3 FB   Neck ROM: full  Mouth opening: > = 3 FB   Dental:    (+) caps      Pulmonary:Negative Pulmonary ROS breath sounds clear to auscultation      (-) not a current smoker                           Cardiovascular:  Exercise tolerance: good (>4 METS),   (+) hyperlipidemia    (-) past MI and CAD    ECG reviewed  Rhythm: regular  Rate: normal           Beta Blocker:  Not on Beta Blocker         Neuro/Psych:   (+) psychiatric history: stable with treatmentdepression/anxiety             GI/Hepatic/Renal:        (-) no renal disease      ROS comment: Dysphagia  BPH. Endo/Other:    (+) hypothyroidism: arthritis: OA., .                 Abdominal:         (-) obese       Vascular: negative vascular ROS. Other Findings:             Anesthesia Plan      MAC     ASA 3       Induction: intravenous. Anesthetic plan and risks discussed with patient and child/children. Plan discussed with CRNA.                     Ketty Novoa MD   [id number]

## 2022-12-29 NOTE — OP NOTE
[address] [address]                                OPERATIVE REPORT    PATIENT NAME: Jaskaran Pulido                    :        1929  MED REC NO:   [id number]                            ROOM:  ACCOUNT NO:   [de-identified]                           ADMIT DATE: 2022  PROVIDER:     Rosalinda King MD    DATE OF PROCEDURE:  2022    PROCEDURES PERFORMED:  Esophagogastroduodenoscopy plus balloon  dilatation of symptomatic Schatzki's ring. PREOPERATIVE DIAGNOSES:  Recurrent symptoms of solid food dysphagia,  intermittent; known Schatzki's ring with previous multiple dilatations. POSTOPERATIVE DIAGNOSES:  1.  Schatzki's ring, dilated by balloon to 19 mm.  2.  Diminutive hiatal hernia. 3.  Normal stomach and duodenum. INDICATIONS:  A 60-year-old male with a history of a ring for which he  has undergone dilatation at one- to two-year intervals, the last  approximately 21 months ago. Preop is monitored anesthesia care. DESCRIPTION OF PROCEDURE:  With he in the left lateral and a bite block  in place, the Olympus GIF-H190 video endoscope was guided into the  oropharynx and then into the cervical esophagus. Proximal and mid  esophagus again normal.  Ring at the GE junction, which was not tight. There were no inflammatory changes and the associated hiatal hernia did  not exceed 2 cm. The stomach and duodenum were normal.  The endoscope  was withdrawn to the GE junction and a TTS balloon was inflated to 18 mm  to 19 mm. When the balloon was deflated, there was a reasonable mucosal  disruption at the level of the ring. Procedure was terminated after a  gastric decompression and well tolerated. EBL: 0    IMPRESSION:  Recurrent requirements for esophageal dilatation well  tolerated. Diet resumed.         Lucy Keyes MD    D: 2022 11:02:47       T: 2022 11:06:20 [id number]  Job#: [id number]     Doc#: [id number]    CC:  Dr. Nader Ovalles

## 2023-01-05 DIAGNOSIS — M15.0 PRIMARY GENERALIZED (OSTEO)ARTHRITIS: ICD-10-CM

## 2023-01-05 DIAGNOSIS — E78.2 MIXED HYPERLIPIDEMIA: ICD-10-CM

## 2023-01-05 RX ORDER — SIMVASTATIN 40 MG
40 TABLET ORAL NIGHTLY
Qty: 90 TABLET | Refills: 1 | Status: SHIPPED | OUTPATIENT
Start: 2023-01-05

## 2024-02-08 ENCOUNTER — APPOINTMENT (OUTPATIENT)
Dept: CT IMAGING | Age: 89
End: 2024-02-08
Payer: MEDICARE

## 2024-02-08 ENCOUNTER — HOSPITAL ENCOUNTER (EMERGENCY)
Age: 89
Discharge: HOME | End: 2024-02-09
Attending: EMERGENCY MEDICINE
Payer: MEDICARE

## 2024-02-08 DIAGNOSIS — N30.00 ACUTE CYSTITIS WITHOUT HEMATURIA: ICD-10-CM

## 2024-02-08 DIAGNOSIS — K59.00 CONSTIPATION, UNSPECIFIED CONSTIPATION TYPE: Primary | ICD-10-CM

## 2024-02-08 DIAGNOSIS — K56.600 PARTIAL INTESTINAL OBSTRUCTION, UNSPECIFIED CAUSE (HCC): ICD-10-CM

## 2024-02-08 LAB
ALBUMIN SERPL-MCNC: 4.6 G/DL (ref 3.5–5.2)
ALP SERPL-CCNC: 63 U/L (ref 40–129)
ALT SERPL-CCNC: 14 U/L (ref 0–40)
ANION GAP SERPL CALCULATED.3IONS-SCNC: 12 MMOL/L (ref 7–16)
AST SERPL-CCNC: 25 U/L (ref 0–39)
BASOPHILS # BLD: 0.04 K/UL (ref 0–0.2)
BASOPHILS NFR BLD: 0 % (ref 0–2)
BILIRUB SERPL-MCNC: 0.8 MG/DL (ref 0–1.2)
BILIRUB UR QL STRIP: NEGATIVE
BUN SERPL-MCNC: 19 MG/DL (ref 6–23)
CALCIUM SERPL-MCNC: 10 MG/DL (ref 8.6–10.2)
CHLORIDE SERPL-SCNC: 105 MMOL/L (ref 98–107)
CLARITY UR: ABNORMAL
CO2 SERPL-SCNC: 24 MMOL/L (ref 22–29)
COLOR UR: YELLOW
CREAT SERPL-MCNC: 1.3 MG/DL (ref 0.7–1.2)
EOSINOPHIL # BLD: 0.01 K/UL (ref 0.05–0.5)
EOSINOPHILS RELATIVE PERCENT: 0 % (ref 0–6)
ERYTHROCYTE [DISTWIDTH] IN BLOOD BY AUTOMATED COUNT: 17.9 % (ref 11.5–15)
GFR SERPL CREATININE-BSD FRML MDRD: 51 ML/MIN/1.73M2
GLUCOSE SERPL-MCNC: 96 MG/DL (ref 74–99)
GLUCOSE UR STRIP-MCNC: NEGATIVE MG/DL
HCT VFR BLD AUTO: 42.2 % (ref 37–54)
HGB BLD-MCNC: 13.2 G/DL (ref 12.5–16.5)
HGB UR QL STRIP.AUTO: ABNORMAL
IMM GRANULOCYTES # BLD AUTO: 0.06 K/UL (ref 0–0.58)
IMM GRANULOCYTES NFR BLD: 1 % (ref 0–5)
KETONES UR STRIP-MCNC: ABNORMAL MG/DL
LACTATE BLDV-SCNC: 1.6 MMOL/L (ref 0.5–2.2)
LEUKOCYTE ESTERASE UR QL STRIP: ABNORMAL
LIPASE SERPL-CCNC: 23 U/L (ref 13–60)
LYMPHOCYTES NFR BLD: 1.89 K/UL (ref 1.5–4)
LYMPHOCYTES RELATIVE PERCENT: 20 % (ref 20–42)
MCH RBC QN AUTO: 27.5 PG (ref 26–35)
MCHC RBC AUTO-ENTMCNC: 31.3 G/DL (ref 32–34.5)
MCV RBC AUTO: 87.9 FL (ref 80–99.9)
MONOCYTES NFR BLD: 1.86 K/UL (ref 0.1–0.95)
MONOCYTES NFR BLD: 20 % (ref 2–12)
NEUTROPHILS NFR BLD: 59 % (ref 43–80)
NEUTS SEG NFR BLD: 5.48 K/UL (ref 1.8–7.3)
NITRITE UR QL STRIP: NEGATIVE
PH UR STRIP: 6.5 [PH] (ref 5–9)
PLATELET # BLD AUTO: 229 K/UL (ref 130–450)
PMV BLD AUTO: 9.8 FL (ref 7–12)
POTASSIUM SERPL-SCNC: 4.2 MMOL/L (ref 3.5–5)
PROT SERPL-MCNC: 8.2 G/DL (ref 6.4–8.3)
PROT UR STRIP-MCNC: NEGATIVE MG/DL
RBC # BLD AUTO: 4.8 M/UL (ref 3.8–5.8)
RBC # BLD: ABNORMAL 10*6/UL
RBC #/AREA URNS HPF: ABNORMAL /HPF
SODIUM SERPL-SCNC: 141 MMOL/L (ref 132–146)
SP GR UR STRIP: 1.01 (ref 1–1.03)
UROBILINOGEN UR STRIP-ACNC: 0.2 EU/DL (ref 0–1)
WBC #/AREA URNS HPF: ABNORMAL /HPF
WBC OTHER # BLD: 9.3 K/UL (ref 4.5–11.5)

## 2024-02-08 PROCEDURE — 87077 CULTURE AEROBIC IDENTIFY: CPT

## 2024-02-08 PROCEDURE — 81001 URINALYSIS AUTO W/SCOPE: CPT

## 2024-02-08 PROCEDURE — 87086 URINE CULTURE/COLONY COUNT: CPT

## 2024-02-08 PROCEDURE — 74176 CT ABD & PELVIS W/O CONTRAST: CPT

## 2024-02-08 PROCEDURE — 99284 EMERGENCY DEPT VISIT MOD MDM: CPT

## 2024-02-08 PROCEDURE — 6370000000 HC RX 637 (ALT 250 FOR IP): Performed by: STUDENT IN AN ORGANIZED HEALTH CARE EDUCATION/TRAINING PROGRAM

## 2024-02-08 PROCEDURE — 80053 COMPREHEN METABOLIC PANEL: CPT

## 2024-02-08 PROCEDURE — 83605 ASSAY OF LACTIC ACID: CPT

## 2024-02-08 PROCEDURE — 83690 ASSAY OF LIPASE: CPT

## 2024-02-08 PROCEDURE — 85025 COMPLETE CBC W/AUTO DIFF WBC: CPT

## 2024-02-08 RX ORDER — CEFDINIR 300 MG/1
300 CAPSULE ORAL 2 TIMES DAILY
Qty: 14 CAPSULE | Refills: 0 | Status: SHIPPED | OUTPATIENT
Start: 2024-02-08 | End: 2024-02-15

## 2024-02-08 RX ORDER — CEFDINIR 300 MG/1
300 CAPSULE ORAL ONCE
Status: COMPLETED | OUTPATIENT
Start: 2024-02-08 | End: 2024-02-08

## 2024-02-08 RX ORDER — POLYETHYLENE GLYCOL 3350 17 G/17G
17 POWDER, FOR SOLUTION ORAL DAILY PRN
Qty: 510 G | Refills: 0 | Status: SHIPPED | OUTPATIENT
Start: 2024-02-08 | End: 2024-03-09

## 2024-02-08 RX ORDER — DOCUSATE SODIUM 100 MG/1
100 CAPSULE, LIQUID FILLED ORAL 2 TIMES DAILY
Qty: 60 CAPSULE | Refills: 0 | Status: SHIPPED | OUTPATIENT
Start: 2024-02-08 | End: 2024-03-09

## 2024-02-08 RX ADMIN — CEFDINIR 300 MG: 300 CAPSULE ORAL at 22:57

## 2024-02-08 ASSESSMENT — LIFESTYLE VARIABLES: HOW MANY STANDARD DRINKS CONTAINING ALCOHOL DO YOU HAVE ON A TYPICAL DAY: PATIENT DOES NOT DRINK

## 2024-02-08 ASSESSMENT — PAIN - FUNCTIONAL ASSESSMENT: PAIN_FUNCTIONAL_ASSESSMENT: NONE - DENIES PAIN

## 2024-02-08 NOTE — ED NOTES
Department of Emergency Medicine  FIRST PROVIDER TRIAGE NOTE             Independent MLP           2/8/24  12:27 PM EST    Date of Encounter: 2/8/24   MRN: [id number]      HPI: Jax Oreilly is a 89 y.o. male who presents to the ED for Constipation (States he has a blockage in his rectum, no bm for over a week )     Patient had constipation as a no bowel movement for almost a week.  Patient has tried Dulcolax.  Patient is concerned that he has a blockage.  Patient was sent by family doctor.    ROS: Negative for cp, sob, fever, or cough.    PE: Gen Appearance/Constitutional: alert  HEENT: NC/NT. PERRLA,  Airway patent.  Neck: supple     Initial Plan of Care: All treatment areas with department are currently occupied. Plan to order/Initiate the following while awaiting opening in ED: labs and imaging studies.  Initiate Treatment-Testing, Proceed toTreatment Area When Bed Available for ED Attending/MLP to Continue Care    Electronically signed by Jennifer Loomis PA-C   DD: 2/8/24       Jennifer Loomis PA-C  02/08/24 [id number]

## 2024-02-08 NOTE — DISCHARGE INSTRUCTIONS
Please follow-up with your primary care doctor.  Please return ED for new or worsening symptoms.    Please stay well-hydrated.

## 2024-02-08 NOTE — ED PROVIDER NOTES
[facility] EMERGENCY DEPARTMENT  EMERGENCY DEPARTMENT ENCOUNTER        Pt Name: Jax Oreilly  MRN: [id number]  Birthdate 2/4/1929  Date of evaluation: 2/8/2024  Provider: Sergio Valerio DO  PCP: Luis A Miller DO  Note Started: 1:17 PM EST 2/8/24    CHIEF COMPLAINT       Chief Complaint   Patient presents with    Constipation     States he has a blockage in his rectum, no bm for over a week        HISTORY OF PRESENT ILLNESS: 1 or more Elements   History From: Patient    Limitations to history : None    Jax Oreilly is a 89 y.o. male who presents for constipation    Past medical history of CKD, dysphagia, Schatzki ring, thyroid disease, HLD, enlarged prostate, anemia    Patient is a 89-year-old male presenting for constipation.  He states that he has not had a bowel movement since 2/5/2024.  He has not passed gas since that time as well.  He denies nausea or vomiting.  Denies recent fevers or chills.  Denies chest pain cough or shortness of breath.  He states that he has not had any previous abdominal surgeries.  He did have 1 instance of severe constipation that required manual disimpaction or enema 25 years ago.  He has discomfort over the suprapubic region and rectal pain.  He has been taking MiraLAX every 8 hours since Monday without relief.    Nursing Notes were all reviewed and agreed with or any disagreements were addressed in the HPI.      REVIEW OF EXTERNAL NOTE :           REVIEW OF SYSTEMS :           Positives and Pertinent negatives as per HPI.     SURGICAL HISTORY     Past Surgical History:   Procedure Laterality Date    CATARACT REMOVAL WITH IMPLANT Bilateral     ESOPHAGUS SURGERY      x 3    PROSTATE BIOPSY N/A 6/7/2021    PROSTATE BIOPSY performed by Hi Leslie MD at [facility] OR    TOOTH EXTRACTION      TURP N/A 6/7/2021    CYSTOSCOPY RETROGRADE PYELOGRAMS TRANSURETHRAL RESECTION PROSTATE performed by Hi Leslie MD at [facility] OR    [facility]  (*)     Ketones, Urine TRACE (*)     Urine Hgb TRACE (*)     Leukocyte Esterase, Urine MODERATE (*)     WBC, UA 21 TO 50 (*)     All other components within normal limits   LACTIC ACID   LIPASE       As interpreted by me, the above displayed labs are abnormal. All other labs obtained during this visit were within normal range or not returned as of this dictation.      EKG Interpretation  Interpreted by emergency department physician, Sergio Valerio DO              RADIOLOGY:   Non-plain film images such as CT, Ultrasound and MRI are read by the radiologist. Plain radiographic images are visualized and preliminarily interpreted by the ED Provider with the below findings:        Interpretation per the Radiologist below, if available at the time of this note:    CT ABDOMEN PELVIS WO CONTRAST Additional Contrast? None   Final Result   1. Mild urinary bladder wall thickening likely from incomplete distension or   cystitis. Some fat stranding around the bladder. Correlate with urinalysis.   2. Constipation with distal colonic fecal impaction.   3. Diverticulosis.   4. Small gallstone.   5. Bilateral kidney cysts.   6. Small right kidney stones. No obstructing stone or hydronephrosis.           No results found.    No results found.    PROCEDURES   Unless otherwise noted below, none          CRITICAL CARE TIME (.cct)       PAST MEDICAL HISTORY/Chronic Conditions Affecting Care      has a past medical history of Anemia, Arthritis, CKD (chronic kidney disease), Dysphagia, Enlarged prostate, Hyperlipidemia, Lower esophageal ring (Schatzki) (05/02/2019), Mixed hyperlipidemia (5/2/2019), and Thyroid disease.     EMERGENCY DEPARTMENT COURSE    Vitals:    Vitals:    02/08/24 1227 02/08/24 1230 02/08/24 2110 02/09/24 0528   BP:  (!) 148/98 (!) 176/95 (!) 156/85   Pulse: 80  74 74   Resp: 16  16 18   Temp: 98.2 °F (36.8 °C)      TempSrc: Oral      SpO2: 97%  100% 100%   Weight: 63 kg (139 lb)      Height: 1.778 m (5' 10\")

## 2024-02-09 VITALS
DIASTOLIC BLOOD PRESSURE: 85 MMHG | SYSTOLIC BLOOD PRESSURE: 156 MMHG | OXYGEN SATURATION: 100 % | TEMPERATURE: 98.2 F | HEIGHT: 70 IN | WEIGHT: 139 LBS | HEART RATE: 74 BPM | BODY MASS INDEX: 19.9 KG/M2 | RESPIRATION RATE: 18 BRPM

## 2024-02-11 LAB
MICROORGANISM SPEC CULT: ABNORMAL
SPECIMEN DESCRIPTION: ABNORMAL

## 2024-02-13 ENCOUNTER — HOSPITAL ENCOUNTER (EMERGENCY)
Age: 89
Discharge: HOME | End: 2024-02-13
Attending: EMERGENCY MEDICINE
Payer: MEDICARE

## 2024-02-13 VITALS
HEART RATE: 80 BPM | DIASTOLIC BLOOD PRESSURE: 90 MMHG | SYSTOLIC BLOOD PRESSURE: 158 MMHG | RESPIRATION RATE: 16 BRPM | BODY MASS INDEX: 19.9 KG/M2 | WEIGHT: 139 LBS | TEMPERATURE: 98.4 F | HEIGHT: 70 IN | OXYGEN SATURATION: 100 %

## 2024-02-13 DIAGNOSIS — K62.89 RECTAL PAIN: Primary | ICD-10-CM

## 2024-02-13 PROCEDURE — 99283 EMERGENCY DEPT VISIT LOW MDM: CPT

## 2024-02-13 NOTE — DISCHARGE INSTRUCTIONS
As we discussed, continue using your home medications for constipation.  You may use the topical benzocaine for further symptom relief.  Use the sitz bath's as needed for further soothing and cooling of the area.  We also recommend that you apply shaving cream when you feel the urge to have a bowel movement for further relief and to ease your symptoms.  We recommend that you follow-up with your primary care physician follow-up your symptoms and discuss [facility] emergency room visit

## 2024-02-13 NOTE — ED PROVIDER NOTES
[facility] EMERGENCY DEPARTMENT  EMERGENCY DEPARTMENT ENCOUNTER        Pt Name: Jax Oreilly  MRN: [id number]  Birthdate 2/4/1929  Date of evaluation: 2/13/2024  Provider: Shabnam Mendieta MD  Attending Provider: Erasmo Harris DO  PCP: Luis A Miller DO  Note Started: 2:00 PM EST 2/13/24    CHIEF COMPLAINT       Chief Complaint   Patient presents with    Constipation     Pt seen here last Thursday for constipation enema attempted in ER. Pt sent home with stool softners. States had bowel movement this morning \"hard like a rock\" is now have 10/10 rectal pain        HISTORY OF PRESENT ILLNESS: 1 or more Elements   History From: The patient        Jax Oreilly is a 89 y.o. male with a past medical history of chronic kidney disease, Schatzki ring, chronic right shoulder pain, hypothyroidism, prior large prostate presenting with constipation.  Patient states that he was here several days ago for constipation.  He had an enema in the emergency room and was diagnosed with urinary tract infection and sent home on medications for constipation and antibiotics.  He states that he had a large bowel movement this morning.  He states that his daughter made him come in.  He experiences intermittent episodes of severe rectal pain which he feels like his pressure and not necessarily associated with him having an urge to have a bowel movement.  He has been taking his new medications as they are prescribed and staying well-hydrated.    Nursing Notes were all reviewed and agreed with or any disagreements were addressed in the HPI.      REVIEW OF EXTERNAL NOTE :       ER visit on 2/8/2024 noted, patient had soapsuds enema and a minor fecal disimpaction and had a bowel movement in the emergency room.  He was discharged on    REVIEW OF SYSTEMS :           Positives and Pertinent negatives as per HPI.     SURGICAL HISTORY     Past Surgical History:   Procedure Laterality Date    CATARACT

## 2024-12-31 ENCOUNTER — APPOINTMENT (OUTPATIENT)
Dept: CT IMAGING | Age: 88
End: 2024-12-31
Payer: OTHER MISCELLANEOUS

## 2024-12-31 ENCOUNTER — HOSPITAL ENCOUNTER (EMERGENCY)
Age: 88
Discharge: HOME | End: 2024-12-31
Attending: EMERGENCY MEDICINE
Payer: OTHER MISCELLANEOUS

## 2024-12-31 VITALS
SYSTOLIC BLOOD PRESSURE: 208 MMHG | BODY MASS INDEX: 20.09 KG/M2 | RESPIRATION RATE: 18 BRPM | TEMPERATURE: 98.4 F | WEIGHT: 140 LBS | OXYGEN SATURATION: 99 % | DIASTOLIC BLOOD PRESSURE: 90 MMHG | HEART RATE: 72 BPM

## 2024-12-31 DIAGNOSIS — S02.2XXA CLOSED FRACTURE OF NASAL BONE, INITIAL ENCOUNTER: ICD-10-CM

## 2024-12-31 DIAGNOSIS — S09.90XA CLOSED HEAD INJURY, INITIAL ENCOUNTER: Primary | ICD-10-CM

## 2024-12-31 DIAGNOSIS — S00.81XA ABRASION OF FACE, INITIAL ENCOUNTER: ICD-10-CM
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
## 2024-12-31 PROCEDURE — 71250 CT THORAX DX C-: CPT

## 2024-12-31 PROCEDURE — 74176 CT ABD & PELVIS W/O CONTRAST: CPT

## 2024-12-31 PROCEDURE — 70486 CT MAXILLOFACIAL W/O DYE: CPT

## 2024-12-31 PROCEDURE — 6360000002 HC RX W HCPCS: Performed by: EMERGENCY MEDICINE

## 2024-12-31 PROCEDURE — 72131 CT LUMBAR SPINE W/O DYE: CPT

## 2024-12-31 PROCEDURE — 70450 CT HEAD/BRAIN W/O DYE: CPT

## 2024-12-31 PROCEDURE — 72128 CT CHEST SPINE W/O DYE: CPT

## 2024-12-31 PROCEDURE — 72125 CT NECK SPINE W/O DYE: CPT

## 2024-12-31 PROCEDURE — 90714 TD VACC NO PRESV 7 YRS+ IM: CPT | Performed by: EMERGENCY MEDICINE

## 2024-12-31 PROCEDURE — 99284 EMERGENCY DEPT VISIT MOD MDM: CPT

## 2024-12-31 PROCEDURE — 90471 IMMUNIZATION ADMIN: CPT | Performed by: EMERGENCY MEDICINE

## 2024-12-31 RX ORDER — ERYTHROMYCIN 5 MG/G
1 OINTMENT OPHTHALMIC EVERY 6 HOURS
Qty: 3.5 G | Refills: 0 | Status: SHIPPED | OUTPATIENT
Start: 2024-12-31 | End: 2025-01-07

## 2024-12-31 RX ADMIN — CLOSTRIDIUM TETANI TOXOID ANTIGEN (FORMALDEHYDE INACTIVATED) AND CORYNEBACTERIUM DIPHTHERIAE TOXOID ANTIGEN (FORMALDEHYDE INACTIVATED) 0.5 ML: 5; 2 INJECTION, SUSPENSION INTRAMUSCULAR at 14:18

## 2024-12-31 ASSESSMENT — PAIN SCALES - GENERAL: PAINLEVEL_OUTOF10: 2

## 2024-12-31 ASSESSMENT — PAIN DESCRIPTION - DESCRIPTORS: DESCRIPTORS: DISCOMFORT

## 2024-12-31 ASSESSMENT — PAIN DESCRIPTION - ONSET: ONSET: ON-GOING

## 2024-12-31 ASSESSMENT — VISUAL ACUITY
OD: 20/100
OS: 20/100
OU: 20/70

## 2024-12-31 ASSESSMENT — PAIN DESCRIPTION - LOCATION: LOCATION: FACE

## 2024-12-31 ASSESSMENT — PAIN - FUNCTIONAL ASSESSMENT
PAIN_FUNCTIONAL_ASSESSMENT: PREVENTS OR INTERFERES SOME ACTIVE ACTIVITIES AND ADLS
PAIN_FUNCTIONAL_ASSESSMENT: 0-10

## 2024-12-31 ASSESSMENT — PAIN DESCRIPTION - PAIN TYPE: TYPE: ACUTE PAIN

## 2024-12-31 ASSESSMENT — PAIN DESCRIPTION - ORIENTATION: ORIENTATION: LEFT

## 2024-12-31 ASSESSMENT — PAIN DESCRIPTION - FREQUENCY: FREQUENCY: CONTINUOUS
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
## (undated) DEVICE — GLOVE ORANGE PI 7 1/2   MSG9075

## (undated) DEVICE — SOLUTION SURG PREP ANTIMICROBIAL 4 OZ SKIN WND EXIDINE

## (undated) DEVICE — Z INACTIVE USE 2635503 SOLUTION IRRIG 3000ML ST H2O USP UROMATIC PLAS CONT

## (undated) DEVICE — CABLE ENDOSCP L10FT ACT DISP

## (undated) DEVICE — SYRINGE INFL 60ML DISP ALLIANCE II

## (undated) DEVICE — Device: Brand: LEVEL 1

## (undated) DEVICE — TUBING, SUCTION, 3/16" X 12', STRAIGHT: Brand: MEDLINE

## (undated) DEVICE — Z INACTIVE USE 2635504 SOLUTION IRRIG 3000ML 1.5% GL USP UROMATIC CONT

## (undated) DEVICE — BAG DRNGE COMB PK

## (undated) DEVICE — SYRINGE,TOOMEY,IRRIGATION,70CC,STERILE: Brand: MEDLINE

## (undated) DEVICE — PAD,NON-ADHERENT,2X3,STERILE,LF,1/PK: Brand: MEDLINE

## (undated) DEVICE — ESOPHAGEAL BALLOON DILATATION CATHETER: Brand: CRE FIXED WIRE

## (undated) DEVICE — NEEDLE SPNL 22GA L7IN BLK HUB S STL W/ QNCKE PNT W/OUT

## (undated) DEVICE — ELECTRODE PT RET AD L9FT HI MOIST COND ADH HYDRGEL CORDED

## (undated) DEVICE — SPONGE GZ W4XL4IN RAYON POLY FILL CVR W/ NONWOVEN FAB

## (undated) DEVICE — TOWEL,OR,DSP,ST,BLUE,STD,6/PK,12PK/CS: Brand: MEDLINE

## (undated) DEVICE — BLOCK BITE 60FR RUBBER ADLT DENTAL

## (undated) DEVICE — GRADUATE TRIANG MEASURE 1000ML BLK PRNT

## (undated) DEVICE — LENS CYSTOSCOPE 30 DEG

## (undated) DEVICE — MARKER,SKIN,WI/RULER AND LABELS: Brand: MEDLINE

## (undated) DEVICE — GOWN,SIRUS,FABRNF,XL,20/CS: Brand: MEDLINE

## (undated) DEVICE — SET CYSTOSCOPE 21F

## (undated) DEVICE — CAMERA STRYKER 1488

## (undated) DEVICE — 4-PORT MANIFOLD: Brand: NEPTUNE 2

## (undated) DEVICE — SPECIMEN CUP W/LID: Brand: DEROYAL

## (undated) DEVICE — CYSTO PACK: Brand: MEDLINE INDUSTRIES, INC.

## (undated) DEVICE — GAUZE,SPONGE,4"X4",8PLY,STRL,LF,10/TRAY: Brand: MEDLINE

## (undated) DEVICE — DRAINBAG,ANTI-REFLUX TOWER,L/F,2000ML,LL: Brand: MEDLINE

## (undated) DEVICE — MEDIA CONTRAST ISOVUE  300 10X50ML

## (undated) DEVICE — 6 X 9  1.75MIL 4-WALL LABGUARD: Brand: MINIGRIP COMMERCIAL LLC

## (undated) DEVICE — SYRINGE, LUER LOCK, 10ML: Brand: MEDLINE

## (undated) DEVICE — CATHETER URETH 24FR BLLN 30CC SIL ALLY W/ SIL HYDRGEL 3 W F

## (undated) DEVICE — ELECTRODE ES 28FR WIRE 0.012IN BLU R ANG CUT LOOP STBL FOR

## (undated) DEVICE — KIT SURG PREP POVIDONE IOD PRESATURATED PAINT WET FOR UNIV

## (undated) DEVICE — HOSE CONN FOR WST MGMT SYS NEPTUNE 2

## (undated) DEVICE — SET SURG BASIN MAYO REUSABLE

## (undated) DEVICE — MAX-CORE® DISPOSABLE CORE BIOPSY INSTRUMENT, 18G X 25CM: Brand: MAX-CORE

## (undated) DEVICE — ELECTRODE ES VPR FOR USA ELITE SYS